# Patient Record
Sex: MALE | Race: WHITE
[De-identification: names, ages, dates, MRNs, and addresses within clinical notes are randomized per-mention and may not be internally consistent; named-entity substitution may affect disease eponyms.]

---

## 2017-08-29 PROBLEM — J43.2 CENTRILOBULAR EMPHYSEMA (HCC): Status: ACTIVE | Noted: 2017-08-29

## 2018-02-20 PROBLEM — I10 ESSENTIAL HYPERTENSION: Status: ACTIVE | Noted: 2018-02-20

## 2018-02-20 PROBLEM — E78.00 PURE HYPERCHOLESTEROLEMIA: Status: ACTIVE | Noted: 2018-02-20

## 2018-08-27 PROBLEM — E55.9 VITAMIN D DEFICIENCY: Status: ACTIVE | Noted: 2018-08-27

## 2019-01-16 PROBLEM — E78.2 MIXED HYPERLIPIDEMIA: Status: ACTIVE | Noted: 2019-01-10

## 2019-01-16 PROBLEM — D68.59 HYPERCOAGULABLE STATE (HCC): Status: ACTIVE | Noted: 2017-09-07

## 2019-01-16 PROBLEM — K21.9 GASTROESOPHAGEAL REFLUX DISEASE WITHOUT ESOPHAGITIS: Status: ACTIVE | Noted: 2019-01-10

## 2019-01-16 PROBLEM — I48.91 ATRIAL FIBRILLATION WITH RVR (HCC): Status: ACTIVE | Noted: 2017-09-11

## 2020-02-28 PROBLEM — E78.00 PURE HYPERCHOLESTEROLEMIA: Status: RESOLVED | Noted: 2018-02-20 | Resolved: 2020-02-28

## 2020-08-29 LAB — HBA1C MFR BLD HPLC: 6 %

## 2022-03-14 PROBLEM — I48.0 PAROXYSMAL ATRIAL FIBRILLATION (HCC): Status: ACTIVE | Noted: 2022-03-14

## 2022-03-18 PROBLEM — I48.0 PAROXYSMAL ATRIAL FIBRILLATION (HCC): Status: ACTIVE | Noted: 2022-03-14

## 2022-03-18 PROBLEM — D68.59 HYPERCOAGULABLE STATE (HCC): Status: ACTIVE | Noted: 2017-09-07

## 2022-03-18 PROBLEM — I48.91 ATRIAL FIBRILLATION WITH RVR (HCC): Status: ACTIVE | Noted: 2017-09-11

## 2022-03-19 PROBLEM — K21.9 GASTROESOPHAGEAL REFLUX DISEASE WITHOUT ESOPHAGITIS: Status: ACTIVE | Noted: 2019-01-10

## 2022-03-19 PROBLEM — E78.2 MIXED HYPERLIPIDEMIA: Status: ACTIVE | Noted: 2019-01-10

## 2022-03-19 PROBLEM — J43.2 CENTRILOBULAR EMPHYSEMA (HCC): Status: ACTIVE | Noted: 2017-08-29

## 2022-03-19 PROBLEM — I10 ESSENTIAL HYPERTENSION: Status: ACTIVE | Noted: 2018-02-20

## 2022-03-19 PROBLEM — E55.9 VITAMIN D DEFICIENCY: Status: ACTIVE | Noted: 2018-08-27

## 2022-08-24 RX ORDER — ESOMEPRAZOLE MAGNESIUM 40 MG/1
40 CAPSULE, DELAYED RELEASE ORAL DAILY
Qty: 30 CAPSULE | Refills: 0 | Status: SHIPPED | OUTPATIENT
Start: 2022-08-24

## 2023-01-18 ENCOUNTER — TELEPHONE (OUTPATIENT)
Dept: FAMILY MEDICINE CLINIC | Facility: CLINIC | Age: 73
End: 2023-01-18

## 2023-01-19 ENCOUNTER — TELEPHONE (OUTPATIENT)
Dept: FAMILY MEDICINE CLINIC | Facility: CLINIC | Age: 73
End: 2023-01-19

## 2023-01-19 DIAGNOSIS — E11.9 TYPE 2 DIABETES MELLITUS WITHOUT COMPLICATION, WITHOUT LONG-TERM CURRENT USE OF INSULIN (HCC): ICD-10-CM

## 2023-01-19 DIAGNOSIS — I10 ESSENTIAL (PRIMARY) HYPERTENSION: Primary | ICD-10-CM

## 2023-01-19 RX ORDER — ERGOCALCIFEROL 1.25 MG/1
50000 CAPSULE ORAL
Qty: 4 CAPSULE | Refills: 2 | Status: SHIPPED | OUTPATIENT
Start: 2023-01-19

## 2023-01-19 RX ORDER — ESOMEPRAZOLE MAGNESIUM 40 MG/1
40 CAPSULE, DELAYED RELEASE ORAL DAILY
Qty: 30 CAPSULE | Refills: 0 | Status: SHIPPED | OUTPATIENT
Start: 2023-01-19

## 2023-01-19 NOTE — TELEPHONE ENCOUNTER
Patient has not been seen in over a year has diabetes needs lab work before any more refills can be given and needs a follow-up appointment in the office to go over his lab results

## 2023-01-19 NOTE — TELEPHONE ENCOUNTER
----- Message from [facility] sent at 1/17/2023  2:12 PM EST -----  Subject: Message to Provider    QUESTIONS  Information for Provider? Please have nurse or Dr. Norlene Penton call patient's   son Margaux Brice regarding getting some of his medication refill hoping sooner   that his appt on the 3/6  ---------------------------------------------------------------------------  --------------  0026 [employer] [facility]  [phone]; OK to leave message on voicemail  ---------------------------------------------------------------------------  --------------  SCRIPT ANSWERS  Relationship to Patient? Other  Representative Name? Néstor Brice  Is the Representative on the appropriate HIPAA document in Epic?  Yes

## 2023-02-13 RX ORDER — ALBUTEROL SULFATE 2.5 MG/3ML
2.5 SOLUTION RESPIRATORY (INHALATION) 4 TIMES DAILY
Qty: 540 ML | Refills: 0 | Status: SHIPPED | OUTPATIENT
Start: 2023-02-13

## 2023-03-06 ENCOUNTER — OFFICE VISIT (OUTPATIENT)
Dept: FAMILY MEDICINE CLINIC | Facility: CLINIC | Age: 73
End: 2023-03-06
Payer: MEDICARE

## 2023-03-06 VITALS — BODY MASS INDEX: 25.73 KG/M2 | WEIGHT: 190 LBS | HEIGHT: 72 IN

## 2023-03-06 DIAGNOSIS — J30.89 ENVIRONMENTAL AND SEASONAL ALLERGIES: ICD-10-CM

## 2023-03-06 DIAGNOSIS — K21.9 GASTROESOPHAGEAL REFLUX DISEASE WITHOUT ESOPHAGITIS: ICD-10-CM

## 2023-03-06 DIAGNOSIS — R11.2 NAUSEA AND VOMITING, UNSPECIFIED VOMITING TYPE: ICD-10-CM

## 2023-03-06 DIAGNOSIS — E11.9 TYPE 2 DIABETES MELLITUS WITHOUT COMPLICATION, WITHOUT LONG-TERM CURRENT USE OF INSULIN (HCC): Primary | ICD-10-CM

## 2023-03-06 DIAGNOSIS — E55.9 VITAMIN D DEFICIENCY: ICD-10-CM

## 2023-03-06 DIAGNOSIS — E78.2 MIXED HYPERLIPIDEMIA: ICD-10-CM

## 2023-03-06 LAB
ALBUMIN SERPL-MCNC: 3.5 G/DL (ref 3.2–4.6)
ALBUMIN/GLOB SERPL: 1.2 (ref 0.4–1.6)
ALP SERPL-CCNC: 84 U/L (ref 50–136)
ALT SERPL-CCNC: 54 U/L (ref 12–65)
ANION GAP SERPL CALC-SCNC: 8 MMOL/L (ref 2–11)
AST SERPL-CCNC: 19 U/L (ref 15–37)
BILIRUB SERPL-MCNC: 0.8 MG/DL (ref 0.2–1.1)
BUN SERPL-MCNC: 10 MG/DL (ref 8–23)
CALCIUM SERPL-MCNC: 8.4 MG/DL (ref 8.3–10.4)
CHLORIDE SERPL-SCNC: 97 MMOL/L (ref 101–110)
CHOLEST SERPL-MCNC: 120 MG/DL
CO2 SERPL-SCNC: 29 MMOL/L (ref 21–32)
CREAT SERPL-MCNC: 0.9 MG/DL (ref 0.8–1.5)
GLOBULIN SER CALC-MCNC: 3 G/DL (ref 2.8–4.5)
GLUCOSE SERPL-MCNC: 249 MG/DL (ref 65–100)
HDLC SERPL-MCNC: 83 MG/DL (ref 40–60)
HDLC SERPL: 1.4
LDLC SERPL CALC-MCNC: 8.2 MG/DL
MICROALB/CREAT RATIO, POC: ABNORMAL
MICROALBUMIN URINE, POC: 50 MG/L
POTASSIUM SERPL-SCNC: 3.9 MMOL/L (ref 3.5–5.1)
PROT SERPL-MCNC: 6.5 G/DL (ref 6.3–8.2)
SODIUM SERPL-SCNC: 134 MMOL/L (ref 133–143)
TRIGL SERPL-MCNC: 144 MG/DL (ref 35–150)
VLDLC SERPL CALC-MCNC: 28.8 MG/DL (ref 6–23)

## 2023-03-06 PROCEDURE — 82043 UR ALBUMIN QUANTITATIVE: CPT | Performed by: FAMILY MEDICINE

## 2023-03-06 PROCEDURE — 99215 OFFICE O/P EST HI 40 MIN: CPT | Performed by: FAMILY MEDICINE

## 2023-03-06 PROCEDURE — 1123F ACP DISCUSS/DSCN MKR DOCD: CPT | Performed by: FAMILY MEDICINE

## 2023-03-06 RX ORDER — ONDANSETRON 4 MG/1
4 TABLET, ORALLY DISINTEGRATING ORAL EVERY 8 HOURS PRN
Qty: 30 TABLET | Refills: 1 | Status: SHIPPED | OUTPATIENT
Start: 2023-03-06

## 2023-03-06 RX ORDER — ESOMEPRAZOLE MAGNESIUM 40 MG/1
40 CAPSULE, DELAYED RELEASE ORAL DAILY
Qty: 30 CAPSULE | Refills: 0 | Status: SHIPPED | OUTPATIENT
Start: 2023-03-06

## 2023-03-06 RX ORDER — CETIRIZINE HYDROCHLORIDE 5 MG/1
5 TABLET ORAL DAILY
Qty: 90 TABLET | Refills: 2 | Status: SHIPPED | OUTPATIENT
Start: 2023-03-06

## 2023-03-06 RX ORDER — FERROUS SULFATE 325(65) MG
325 TABLET ORAL
COMMUNITY
Start: 2023-02-27 | End: 2023-06-19

## 2023-03-06 RX ORDER — ERGOCALCIFEROL 1.25 MG/1
50000 CAPSULE ORAL
Qty: 13 CAPSULE | Refills: 1 | Status: SHIPPED | OUTPATIENT
Start: 2023-03-06

## 2023-03-06 SDOH — ECONOMIC STABILITY: INCOME INSECURITY: HOW HARD IS IT FOR YOU TO PAY FOR THE VERY BASICS LIKE FOOD, HOUSING, MEDICAL CARE, AND HEATING?: NOT HARD AT ALL

## 2023-03-06 SDOH — ECONOMIC STABILITY: FOOD INSECURITY: WITHIN THE PAST 12 MONTHS, YOU WORRIED THAT YOUR FOOD WOULD RUN OUT BEFORE YOU GOT MONEY TO BUY MORE.: NEVER TRUE

## 2023-03-06 SDOH — ECONOMIC STABILITY: FOOD INSECURITY: WITHIN THE PAST 12 MONTHS, THE FOOD YOU BOUGHT JUST DIDN'T LAST AND YOU DIDN'T HAVE MONEY TO GET MORE.: NEVER TRUE

## 2023-03-06 SDOH — ECONOMIC STABILITY: HOUSING INSECURITY
IN THE LAST 12 MONTHS, WAS THERE A TIME WHEN YOU DID NOT HAVE A STEADY PLACE TO SLEEP OR SLEPT IN A SHELTER (INCLUDING NOW)?: NO

## 2023-03-06 ASSESSMENT — PATIENT HEALTH QUESTIONNAIRE - PHQ9
9. THOUGHTS THAT YOU WOULD BE BETTER OFF DEAD, OR OF HURTING YOURSELF: 0
SUM OF ALL RESPONSES TO PHQ QUESTIONS 1-9: 0
7. TROUBLE CONCENTRATING ON THINGS, SUCH AS READING THE NEWSPAPER OR WATCHING TELEVISION: 0
SUM OF ALL RESPONSES TO PHQ QUESTIONS 1-9: 0
8. MOVING OR SPEAKING SO SLOWLY THAT OTHER PEOPLE COULD HAVE NOTICED. OR THE OPPOSITE, BEING SO FIGETY OR RESTLESS THAT YOU HAVE BEEN MOVING AROUND A LOT MORE THAN USUAL: 0
5. POOR APPETITE OR OVEREATING: 0
SUM OF ALL RESPONSES TO PHQ QUESTIONS 1-9: 0
1. LITTLE INTEREST OR PLEASURE IN DOING THINGS: 0
SUM OF ALL RESPONSES TO PHQ QUESTIONS 1-9: 0
2. FEELING DOWN, DEPRESSED OR HOPELESS: 0
6. FEELING BAD ABOUT YOURSELF - OR THAT YOU ARE A FAILURE OR HAVE LET YOURSELF OR YOUR FAMILY DOWN: 0
4. FEELING TIRED OR HAVING LITTLE ENERGY: 0
10. IF YOU CHECKED OFF ANY PROBLEMS, HOW DIFFICULT HAVE THESE PROBLEMS MADE IT FOR YOU TO DO YOUR WORK, TAKE CARE OF THINGS AT HOME, OR GET ALONG WITH OTHER PEOPLE: 0
3. TROUBLE FALLING OR STAYING ASLEEP: 0
SUM OF ALL RESPONSES TO PHQ9 QUESTIONS 1 & 2: 0

## 2023-03-06 ASSESSMENT — ENCOUNTER SYMPTOMS
RHINORRHEA: 0
ABDOMINAL PAIN: 0
DIARRHEA: 0
COUGH: 0
SHORTNESS OF BREATH: 0
SINUS PAIN: 0

## 2023-03-06 NOTE — PROGRESS NOTES
PROGRESS NOTE    SUBJECTIVE:   Ramo Dugan is a 67 y.o. male seen for a follow up visit regarding the following chief complaint:     Chief Complaint   Patient presents with    Follow-Up from Hospital     Patient comes for a follow up after being hospitalized for COPD exacerbation    Medication Refill           HPI  Patient presents to the office today after being hospitalized for COPD and has missed multiple appointments his annual physical secondary to being sick all the time and being in the hospital and needs refills on his medication and was told he was not getting any medicines unless he comes in presents to the office for with his son and they state that they could not come in because he could not get oxygen tank to make the necessary arrangements to be able to come here without oxygen    Past Medical History, Past Surgical History, Family history, Social History, and Medications were all reviewed with the patient today and updated as necessary. Current Outpatient Medications   Medication Sig Dispense Refill    ferrous sulfate (IRON 325) 325 (65 Fe) MG tablet Take 325 mg by mouth      albuterol (PROVENTIL) (2.5 MG/3ML) 0.083% nebulizer solution Take 3 mLs by nebulization 4 times daily 540 mL 0    esomeprazole (NEXIUM) 40 MG delayed release capsule Take 1 capsule by mouth daily 30 capsule 0    metFORMIN (GLUCOPHAGE) 500 MG tablet Take 1 tablet by mouth 2 times daily (with meals) TAKE 1 TABLET BY MOUTH TWICE DAILY (WITH MEALS) 60 tablet 0    ergocalciferol (ERGOCALCIFEROL) 1.25 MG (30243 UT) capsule Take 1 capsule by mouth every 7 days 4 capsule 2    OXYGEN Use as instructed. Use as instructed.  DME: [facility] 2-3lpm continuously      PREDNISONE PO Take 5 mg by mouth      acetaminophen (TYLENOL) 500 MG tablet Take 1,000 mg by mouth daily as needed      albuterol sulfate  (90 Base) MCG/ACT inhaler Inhale 2 puffs into the lungs every 4 hours as needed      carvedilol (COREG) 3.125 MG tablet Take 3.125 mg by mouth 2 times daily (with meals)      cetirizine (ZYRTEC) 5 MG tablet Take by mouth      Fluticasone-Umeclidin-Vilant (TRELEGY ELLIPTA) 200-62.5-25 MCG/INH AEPB INHALE 1 PUFF ONCE DAILY      lisinopril (PRINIVIL;ZESTRIL) 10 MG tablet Take 10 mg by mouth daily      mirtazapine (REMERON) 15 MG tablet Take 15 mg by mouth      ondansetron (ZOFRAN-ODT) 4 MG disintegrating tablet Take 4 mg by mouth every 8 hours as needed      rosuvastatin (CRESTOR) 20 MG tablet Take 20 mg by mouth      warfarin (COUMADIN) 10 MG tablet Take 10 mg by mouth daily      warfarin (COUMADIN) 5 MG tablet Take 5 mg by mouth daily       No current facility-administered medications for this visit. Allergies   Allergen Reactions    Azithromycin Other (See Comments)     EKG changes  Other reaction(s): Other- (not listed) - Allergy  EKG changes, including Vtach. Erythromycin Other (See Comments)     Other reaction(s):  Other- (not listed) - Allergy  EKG changes     Patient Active Problem List   Diagnosis    Atrial fibrillation with RVR (HCC)    Hypercoagulable state ([facility]    Malnutrition ([facility]    Acute vascular disorder of intestine (HCC)    Paroxysmal atrial fibrillation (HCC)    Centrilobular emphysema (HCC)    Tobacco use    Elevated troponin    Abnormal weight loss    Vitamin D deficiency    Cardiomyopathy ([facility]    Mixed hyperlipidemia    Abnormal findings on diagnostic imaging of other specified body structures    Long term current use of anticoagulant therapy    Essential hypertension    Chronic combined systolic and diastolic congestive heart failure (HCC)    Mesenteric ischemia, chronic (HCC)    Acute on chronic respiratory failure with hypoxemia (HCC)    Gastroesophageal reflux disease without esophagitis    Hypoxemia    Diabetes mellitus type II, controlled, with no complications (HCC)    COPD (chronic obstructive pulmonary disease) ([facility])    H/O ischemic bowel disease    Embolism (HCC)    Nonsustained ventricular tachycardia     Past Medical History:   Diagnosis Date    Abnormal weight loss 2016    Acute vascular disorder of intestine ([facility] 2016    Chronic lung disease     COPD (chronic obstructive pulmonary disease) ([facility] 2016    Diabetes mellitus type II, controlled, with no complications ([facility] 3/10/9999    Elevated troponin 2016    Tobacco use 2016    Vitamin D deficiency 2018     Past Surgical History:   Procedure Laterality Date    APPENDECTOMY  2009    CYST REMOVAL Right     Right shoulder    HERNIA REPAIR  2005    LAPAROSCOPY  2015    Dr. Alen Trevizo  2015    Washout & abd closure-Dr. Xiomara Mann    OTHER SURGICAL HISTORY  2015    Peg Tube Placement-Dr. Yuliya Davila    SMALL INTESTINE SURGERY  2015    Abd washout & skin closure-Dr. Elder Woodruff    SMALL INTESTINE SURGERY  2015    Abd washout & vac placement-Dr. Luis Felipe Luke     Family History   Problem Relation Age of Onset    Cancer Mother     Heart Disease Father     Cancer Sister     Cancer Brother      Social History     Tobacco Use    Smoking status: Former     Packs/day: 0.25     Years: 55.00     Pack years: 13.75     Types: Cigarettes     Start date: 1966     Quit date: 2021     Years since quittin.7    Smokeless tobacco: Never   Substance Use Topics    Alcohol use: No         Review of Systems   Constitutional:  Negative for chills, fatigue and fever. HENT:  Negative for congestion, rhinorrhea and sinus pain. Eyes:  Negative for visual disturbance. Respiratory:  Negative for cough and shortness of breath. Cardiovascular:  Negative for chest pain. Gastrointestinal:  Negative for abdominal pain and diarrhea. Genitourinary:  Negative for dysuria. Musculoskeletal:  Negative for arthralgias and myalgias. Neurological:  Negative for dizziness, weakness and headaches. Psychiatric/Behavioral: Negative.          OBJECTIVE:  Ht 6' (1.829 m)   Wt 190 lb (86.2 kg)   BMI 25.77 kg/m² Physical Exam  Vitals and nursing note reviewed. Constitutional:       Appearance: Normal appearance. HENT:      Head: Normocephalic and atraumatic. Right Ear: Tympanic membrane normal.      Left Ear: Tympanic membrane normal.      Nose: Nose normal.      Mouth/Throat:      Mouth: Mucous membranes are moist.   Eyes:      Conjunctiva/sclera: Conjunctivae normal.      Pupils: Pupils are equal, round, and reactive to light. Cardiovascular:      Rate and Rhythm: Normal rate and regular rhythm. Pulses: Normal pulses. Heart sounds: Normal heart sounds. Pulmonary:      Effort: Pulmonary effort is normal.      Breath sounds: Normal breath sounds. Abdominal:      General: Abdomen is flat. Bowel sounds are normal.      Palpations: Abdomen is soft. Musculoskeletal:         General: Normal range of motion. Cervical back: Normal range of motion and neck supple. Skin:     General: Skin is warm and dry. Neurological:      General: No focal deficit present. Mental Status: He is alert and oriented to person, place, and time. Psychiatric:         Behavior: Behavior normal.        Medical problems and test results were reviewed with the patient today. No results found for this or any previous visit (from the past 672 hour(s)).     ASSESSMENT and PLAN    Visit Diagnoses and Associated Orders       Type 2 diabetes mellitus without complication, without long-term current use of insulin ([facility])    -  Primary         Gastroesophageal reflux disease without esophagitis             Environmental and seasonal allergies             Vitamin D deficiency             ORDERS WITHOUT AN ASSOCIATED DIAGNOSIS    ferrous sulfate (IRON 325) 325 (65 Fe) MG tablet [[id number]]      metFORMIN (GLUCOPHAGE) 500 MG tablet [[id number]]   - Pending Order    esomeprazole (NEXIUM) 40 MG delayed release capsule [[id number]]   - Pending Order    cetirizine (ZYRTEC) 5 MG tablet [[id number]]   - Pending Order    ondansetron (ZOFRAN-ODT) 4 MG disintegrating tablet [[id number]]   - Pending Order    ergocalciferol (ERGOCALCIFEROL) 1.25 MG (28293 UT) capsule [[id number]]   - Pending Order                Diagnosis Orders   1. Type 2 diabetes mellitus without complication, without long-term current use of insulin ([facility])  metFORMIN (GLUCOPHAGE) 500 MG tablet    AMB POC URINE, MICROALBUMIN    Hemoglobin A1C    Comprehensive Metabolic Panel      2. Gastroesophageal reflux disease without esophagitis  esomeprazole (NEXIUM) 40 MG delayed release capsule      3. Environmental and seasonal allergies  cetirizine (ZYRTEC) 5 MG tablet      4. Vitamin D deficiency  ergocalciferol (ERGOCALCIFEROL) 1.25 MG (45225 UT) capsule      5. Nausea and vomiting, unspecified vomiting type  ondansetron (ZOFRAN-ODT) 4 MG disintegrating tablet      6. Mixed hyperlipidemia  Lipid Panel      , Larissa Net was seen today for follow-up from hospital and medication refill. Diagnoses and all orders for this visit:    Type 2 diabetes mellitus without complication, without long-term current use of insulin ([facility])  -     metFORMIN (GLUCOPHAGE) 500 MG tablet; Take 1 tablet by mouth 2 times daily (with meals) TAKE 1 TABLET BY MOUTH TWICE DAILY (WITH MEALS)  -     AMB POC URINE, MICROALBUMIN; Future  -     Hemoglobin A1C; Future  -     Comprehensive Metabolic Panel; Future    Gastroesophageal reflux disease without esophagitis  -     esomeprazole (NEXIUM) 40 MG delayed release capsule; Take 1 capsule by mouth daily    Environmental and seasonal allergies  -     cetirizine (ZYRTEC) 5 MG tablet; Take 1 tablet by mouth daily    Vitamin D deficiency  -     ergocalciferol (ERGOCALCIFEROL) 1.25 MG (51407 UT) capsule; Take 1 capsule by mouth every 7 days    Nausea and vomiting, unspecified vomiting type  -     ondansetron (ZOFRAN-ODT) 4 MG disintegrating tablet; Take 1 tablet by mouth every 8 hours as needed for Nausea    Mixed hyperlipidemia  -     Lipid Panel;  Future    , We will get some baseline labs went over his medications recommended setting him up for physical in the near future/Medicare wellness visit we will have him return back follow-up of his labs or call back with results and plan. Kiara Stewart More than 50% of this 40 minute visit was spent counseling the patient about multiple complaints.

## 2023-03-07 LAB
EST. AVERAGE GLUCOSE BLD GHB EST-MCNC: 226 MG/DL
HBA1C MFR BLD: 9.5 % (ref 4.8–5.6)

## 2023-03-16 ENCOUNTER — TELEMEDICINE (OUTPATIENT)
Dept: FAMILY MEDICINE CLINIC | Facility: CLINIC | Age: 73
End: 2023-03-16
Payer: MEDICARE

## 2023-03-16 DIAGNOSIS — E11.9 TYPE 2 DIABETES MELLITUS WITHOUT COMPLICATION, WITHOUT LONG-TERM CURRENT USE OF INSULIN (HCC): ICD-10-CM

## 2023-03-16 PROCEDURE — 99442 PR PHYS/QHP TELEPHONE EVALUATION 11-20 MIN: CPT | Performed by: FAMILY MEDICINE

## 2023-03-16 RX ORDER — GLIPIZIDE 10 MG/1
10 TABLET, FILM COATED, EXTENDED RELEASE ORAL DAILY
Qty: 30 TABLET | Refills: 11 | Status: SHIPPED | OUTPATIENT
Start: 2023-03-16 | End: 2023-04-15

## 2023-03-16 ASSESSMENT — PATIENT HEALTH QUESTIONNAIRE - PHQ9
7. TROUBLE CONCENTRATING ON THINGS, SUCH AS READING THE NEWSPAPER OR WATCHING TELEVISION: 0
1. LITTLE INTEREST OR PLEASURE IN DOING THINGS: 0
8. MOVING OR SPEAKING SO SLOWLY THAT OTHER PEOPLE COULD HAVE NOTICED. OR THE OPPOSITE, BEING SO FIGETY OR RESTLESS THAT YOU HAVE BEEN MOVING AROUND A LOT MORE THAN USUAL: 0
9. THOUGHTS THAT YOU WOULD BE BETTER OFF DEAD, OR OF HURTING YOURSELF: 0
5. POOR APPETITE OR OVEREATING: 0
SUM OF ALL RESPONSES TO PHQ QUESTIONS 1-9: 0
SUM OF ALL RESPONSES TO PHQ QUESTIONS 1-9: 0
2. FEELING DOWN, DEPRESSED OR HOPELESS: 0
SUM OF ALL RESPONSES TO PHQ QUESTIONS 1-9: 0
SUM OF ALL RESPONSES TO PHQ9 QUESTIONS 1 & 2: 0
6. FEELING BAD ABOUT YOURSELF - OR THAT YOU ARE A FAILURE OR HAVE LET YOURSELF OR YOUR FAMILY DOWN: 0
10. IF YOU CHECKED OFF ANY PROBLEMS, HOW DIFFICULT HAVE THESE PROBLEMS MADE IT FOR YOU TO DO YOUR WORK, TAKE CARE OF THINGS AT HOME, OR GET ALONG WITH OTHER PEOPLE: 0
3. TROUBLE FALLING OR STAYING ASLEEP: 0
4. FEELING TIRED OR HAVING LITTLE ENERGY: 0
SUM OF ALL RESPONSES TO PHQ QUESTIONS 1-9: 0

## 2023-03-16 ASSESSMENT — ENCOUNTER SYMPTOMS
VOMITING: 0
SHORTNESS OF BREATH: 0
NAUSEA: 0

## 2023-03-16 NOTE — PROGRESS NOTES
PROGRESS NOTE    SUBJECTIVE:   Jarred Swain is a 67 y.o. male seen for a follow up visit regarding the following chief complaint:         HPI Julian Henning is doing a phone call visit to go over his lab results without any new complaints      Past Medical History, Past Surgical History, Family history, Social History, and Medications were all reviewed with the patient today and updated as necessary. Current Outpatient Medications   Medication Sig Dispense Refill    metFORMIN (GLUCOPHAGE) 500 MG tablet TAKE 2TABLET BY MOUTH TWICE DAILY (WITH MEALS) 360 tablet 1    glipiZIDE (GLUCOTROL XL) 10 MG extended release tablet Take 1 tablet by mouth daily 30 tablet 11    esomeprazole (NEXIUM) 40 MG delayed release capsule Take 1 capsule by mouth daily 30 capsule 0    cetirizine (ZYRTEC) 5 MG tablet Take 1 tablet by mouth daily 90 tablet 2    ondansetron (ZOFRAN-ODT) 4 MG disintegrating tablet Take 1 tablet by mouth every 8 hours as needed for Nausea 30 tablet 1    ergocalciferol (ERGOCALCIFEROL) 1.25 MG (79840 UT) capsule Take 1 capsule by mouth every 7 days 13 capsule 1    albuterol (PROVENTIL) (2.5 MG/3ML) 0.083% nebulizer solution Take 3 mLs by nebulization 4 times daily 540 mL 0    OXYGEN Use as instructed. Use as instructed.  DME: [facility] 2-3lpm continuously      PREDNISONE PO Take 5 mg by mouth      acetaminophen (TYLENOL) 500 MG tablet Take 1,000 mg by mouth daily as needed      albuterol sulfate  (90 Base) MCG/ACT inhaler Inhale 2 puffs into the lungs every 4 hours as needed      carvedilol (COREG) 3.125 MG tablet Take 3.125 mg by mouth 2 times daily (with meals)      Fluticasone-Umeclidin-Vilant (TRELEGY ELLIPTA) 200-62.5-25 MCG/INH AEPB INHALE 1 PUFF ONCE DAILY      lisinopril (PRINIVIL;ZESTRIL) 10 MG tablet Take 10 mg by mouth daily      mirtazapine (REMERON) 15 MG tablet Take 15 mg by mouth      rosuvastatin (CRESTOR) 20 MG tablet Take 20 mg by mouth      warfarin (COUMADIN) 10 MG tablet Take 10 mg by mouth daily      warfarin (COUMADIN) 5 MG tablet Take 5 mg by mouth daily       No current facility-administered medications for this visit. Allergies   Allergen Reactions    Azithromycin Other (See Comments)     EKG changes  Other reaction(s): Other- (not listed) - Allergy  EKG changes, including Vtach. Erythromycin Other (See Comments)     Other reaction(s):  Other- (not listed) - Allergy  EKG changes     Patient Active Problem List   Diagnosis    Atrial fibrillation with RVR ([address]    Hypercoagulable state ([address]    Malnutrition ([address]    Acute vascular disorder of intestine (HCC)    Paroxysmal atrial fibrillation (HCC)    Centrilobular emphysema (HCC)    Tobacco use    Elevated troponin    Abnormal weight loss    Vitamin D deficiency    Cardiomyopathy ([address]    Mixed hyperlipidemia    Abnormal findings on diagnostic imaging of other specified body structures    Long term current use of anticoagulant therapy    Essential hypertension    Chronic combined systolic and diastolic congestive heart failure (HCC)    Mesenteric ischemia, chronic (HCC)    Acute on chronic respiratory failure with hypoxemia (HCC)    Gastroesophageal reflux disease without esophagitis    Hypoxemia    Diabetes mellitus type II, controlled, with no complications (HCC)    COPD (chronic obstructive pulmonary disease) (HCC)    H/O ischemic bowel disease    Embolism (HCC)    Nonsustained ventricular tachycardia     Past Medical History:   Diagnosis Date    Abnormal weight loss 2/18/2016    Acute vascular disorder of intestine ([address] 2/18/2016    Chronic lung disease     COPD (chronic obstructive pulmonary disease) ([address] 2/18/2016    Diabetes mellitus type II, controlled, with no complications ([address] 6/82/2719    Elevated troponin 2/18/2016    Tobacco use 2/18/2016    Vitamin D deficiency 8/27/2018     Past Surgical History:   Procedure Laterality Date    APPENDECTOMY  2009    CYST REMOVAL Right     Right shoulder    HERNIA REPAIR  2005    LAPAROSCOPY 2015    Dr. Jalyn Alvarez  2015    Washout & abd closure-Dr. Loyd Needs    OTHER SURGICAL HISTORY  2015    Peg Tube Placement-Dr. Salbador Monson    SMALL INTESTINE SURGERY  2015    Abd washout & skin closure-Dr. Hill Held    SMALL INTESTINE SURGERY  2015    Abd washout & vac placement-Dr. Sukhwinder Alva     Family History   Problem Relation Age of Onset    Cancer Mother     Heart Disease Father     Cancer Sister     Cancer Brother      Social History     Tobacco Use    Smoking status: Former     Packs/day: 0.25     Years: 55.00     Pack years: 13.75     Types: Cigarettes     Start date: 1966     Quit date: 2021     Years since quittin.7    Smokeless tobacco: Never   Substance Use Topics    Alcohol use: No         Review of Systems   Constitutional:  Negative for fatigue and fever. Respiratory:  Negative for shortness of breath. Cardiovascular:  Negative for chest pain. Gastrointestinal:  Negative for nausea and vomiting. OBJECTIVE:  There were no vitals taken for this visit. Physical Exam     Medical problems and test results were reviewed with the patient today.      Recent Results (from the past 672 hour(s))   Comprehensive Metabolic Panel    Collection Time: 23 11:29 AM   Result Value Ref Range    Sodium 134 133 - 143 mmol/L    Potassium 3.9 3.5 - 5.1 mmol/L    Chloride 97 (L) 101 - 110 mmol/L    CO2 29 21 - 32 mmol/L    Anion Gap 8 2 - 11 mmol/L    Glucose 249 (H) 65 - 100 mg/dL    BUN 10 8 - 23 MG/DL    Creatinine 0.90 0.8 - 1.5 MG/DL    Est, Glom Filt Rate >60 >60 ml/min/1.73m2    Calcium 8.4 8.3 - 10.4 MG/DL    Total Bilirubin 0.8 0.2 - 1.1 MG/DL    ALT 54 12 - 65 U/L    AST 19 15 - 37 U/L    Alk Phosphatase 84 50 - 136 U/L    Total Protein 6.5 6.3 - 8.2 g/dL    Albumin 3.5 3.2 - 4.6 g/dL    Globulin 3.0 2.8 - 4.5 g/dL    Albumin/Globulin Ratio 1.2 0.4 - 1.6     Lipid Panel    Collection Time: 23 11:29 AM   Result Value Ref Range    Cholesterol, Total 120 <200 MG/DL    Triglycerides 144 35 - 150 MG/DL    HDL 83 (H) 40 - 60 MG/DL    LDL Calculated 8.2 <100 MG/DL    VLDL Cholesterol Calculated 28.8 (H) 6.0 - 23.0 MG/DL    Chol/HDL Ratio 1.4     Hemoglobin A1C    Collection Time: 03/06/23 11:29 AM   Result Value Ref Range    Hemoglobin A1C 9.5 (H) 4.8 - 5.6 %    eAG 226 mg/dL   AMB POC URINE, MICROALBUMIN    Collection Time: 03/06/23 11:33 AM   Result Value Ref Range    Microalbumin urine, POC 50.0 MG/L    Microalb/Creat Ratio POC         ASSESSMENT and PLAN    Visit Diagnoses and Associated Orders       Type 2 diabetes mellitus without complication, without long-term current use of insulin ([facility])        metFORMIN (GLUCOPHAGE) 500 MG tablet [[id number]]      glipiZIDE (GLUCOTROL XL) 10 MG extended release tablet [[id number]]                       Diagnosis Orders   1. Type 2 diabetes mellitus without complication, without long-term current use of insulin ([facility])  metFORMIN (GLUCOPHAGE) 500 MG tablet    glipiZIDE (GLUCOTROL XL) 10 MG extended release tablet      , Diagnoses and all orders for this visit:    Type 2 diabetes mellitus without complication, without long-term current use of insulin ([facility])  -     metFORMIN (GLUCOPHAGE) 500 MG tablet; TAKE 2TABLET BY MOUTH TWICE DAILY (WITH MEALS)  -     glipiZIDE (GLUCOTROL XL) 10 MG extended release tablet;  Take 1 tablet by mouth daily    , Reviewed his labs answered all his questions counseling supportive care recommended increasing his metformin to 2 tablets of 500 twice daily and add Glucotrol along with diet and exercise recheck labs when he is due for his Medicare wellness visit/physical in June

## 2023-04-13 ENCOUNTER — TELEPHONE (OUTPATIENT)
Dept: FAMILY MEDICINE CLINIC | Facility: CLINIC | Age: 73
End: 2023-04-13

## 2023-04-14 DIAGNOSIS — K21.9 GASTROESOPHAGEAL REFLUX DISEASE WITHOUT ESOPHAGITIS: ICD-10-CM

## 2023-04-14 RX ORDER — ESOMEPRAZOLE MAGNESIUM 40 MG/1
40 CAPSULE, DELAYED RELEASE ORAL DAILY
Qty: 90 CAPSULE | Refills: 3 | Status: SHIPPED | OUTPATIENT
Start: 2023-04-14

## 2023-08-29 ENCOUNTER — TELEPHONE (OUTPATIENT)
Dept: FAMILY MEDICINE CLINIC | Facility: CLINIC | Age: 73
End: 2023-08-29

## 2023-09-01 DIAGNOSIS — E55.9 VITAMIN D DEFICIENCY: ICD-10-CM

## 2023-09-06 RX ORDER — ERGOCALCIFEROL 1.25 MG/1
CAPSULE ORAL
Qty: 13 CAPSULE | Refills: 0 | OUTPATIENT
Start: 2023-09-06

## 2023-09-08 ENCOUNTER — OFFICE VISIT (OUTPATIENT)
Dept: FAMILY MEDICINE CLINIC | Facility: CLINIC | Age: 73
End: 2023-09-08
Payer: MEDICARE

## 2023-09-08 VITALS
HEIGHT: 72 IN | DIASTOLIC BLOOD PRESSURE: 74 MMHG | BODY MASS INDEX: 25.27 KG/M2 | WEIGHT: 186.6 LBS | SYSTOLIC BLOOD PRESSURE: 128 MMHG

## 2023-09-08 DIAGNOSIS — D64.9 ANEMIA, UNSPECIFIED TYPE: ICD-10-CM

## 2023-09-08 DIAGNOSIS — J30.89 ENVIRONMENTAL AND SEASONAL ALLERGIES: ICD-10-CM

## 2023-09-08 DIAGNOSIS — E55.9 VITAMIN D DEFICIENCY: ICD-10-CM

## 2023-09-08 DIAGNOSIS — R31.9 HEMATURIA, UNSPECIFIED TYPE: ICD-10-CM

## 2023-09-08 DIAGNOSIS — R11.2 NAUSEA AND VOMITING, UNSPECIFIED VOMITING TYPE: ICD-10-CM

## 2023-09-08 DIAGNOSIS — K21.9 GASTROESOPHAGEAL REFLUX DISEASE WITHOUT ESOPHAGITIS: ICD-10-CM

## 2023-09-08 DIAGNOSIS — D72.829 LEUKOCYTOSIS, UNSPECIFIED TYPE: Primary | ICD-10-CM

## 2023-09-08 LAB
BILIRUBIN, URINE, POC: NEGATIVE
BLOOD URINE, POC: NEGATIVE
GLUCOSE URINE, POC: NEGATIVE
GRANS ABSOLUTE, POC: 6.8 K/UL
GRANULOCYTES %, POC: 77.1 %
HEMATOCRIT, POC: ABNORMAL %
HEMOGLOBIN, POC: ABNORMAL G/DL
KETONES, URINE, POC: NEGATIVE
LEUKOCYTE ESTERASE, URINE, POC: ABNORMAL
LYMPHOCYTE %, POC: 17.3 %
LYMPHS ABSOLUTE, POC: 1.5 K/UL
MCH, POC: ABNORMAL PG (ref 20–?)
MCHC, POC: ABNORMAL
MCV, POC: ABNORMAL
MONOCYTE %, POC: 5.6 %
MONOCYTE, ABSOLUTE POC: 0.5 K/UL
MPV, POC: 7.7 FL
NITRITE, URINE, POC: NEGATIVE
PH, URINE, POC: 5.5 (ref 4.6–8)
PLATELET COUNT, POC: 332 K/UL
PROTEIN,URINE, POC: NEGATIVE
RBC, POC: 4.47 M/UL
RDW, POC: ABNORMAL %
SPECIFIC GRAVITY, URINE, POC: 1.01 (ref 1–1.03)
URINALYSIS CLARITY, POC: ABNORMAL
URINALYSIS COLOR, POC: YELLOW
UROBILINOGEN, POC: NORMAL
WBC, POC: 8.8 K/UL

## 2023-09-08 PROCEDURE — 1123F ACP DISCUSS/DSCN MKR DOCD: CPT | Performed by: FAMILY MEDICINE

## 2023-09-08 PROCEDURE — 99214 OFFICE O/P EST MOD 30 MIN: CPT | Performed by: FAMILY MEDICINE

## 2023-09-08 PROCEDURE — 3074F SYST BP LT 130 MM HG: CPT | Performed by: FAMILY MEDICINE

## 2023-09-08 PROCEDURE — 36415 COLL VENOUS BLD VENIPUNCTURE: CPT | Performed by: FAMILY MEDICINE

## 2023-09-08 PROCEDURE — 81003 URINALYSIS AUTO W/O SCOPE: CPT | Performed by: FAMILY MEDICINE

## 2023-09-08 PROCEDURE — 85025 COMPLETE CBC W/AUTO DIFF WBC: CPT | Performed by: FAMILY MEDICINE

## 2023-09-08 PROCEDURE — 3078F DIAST BP <80 MM HG: CPT | Performed by: FAMILY MEDICINE

## 2023-09-08 RX ORDER — AZITHROMYCIN 250 MG/1
TABLET, FILM COATED ORAL
COMMUNITY
Start: 2023-09-05

## 2023-09-08 RX ORDER — CETIRIZINE HYDROCHLORIDE 5 MG/1
5 TABLET ORAL DAILY
Qty: 90 TABLET | Refills: 3 | Status: SHIPPED | OUTPATIENT
Start: 2023-09-08

## 2023-09-08 RX ORDER — ESOMEPRAZOLE MAGNESIUM 40 MG/1
40 CAPSULE, DELAYED RELEASE ORAL DAILY
Qty: 90 CAPSULE | Refills: 3 | Status: SHIPPED | OUTPATIENT
Start: 2023-09-08

## 2023-09-08 RX ORDER — ERGOCALCIFEROL 1.25 MG/1
50000 CAPSULE ORAL
Qty: 13 CAPSULE | Refills: 4 | Status: SHIPPED | OUTPATIENT
Start: 2023-09-08

## 2023-09-08 RX ORDER — PREDNISONE 5 MG/1
5 TABLET ORAL DAILY
COMMUNITY
Start: 2023-08-10

## 2023-09-08 RX ORDER — ONDANSETRON 4 MG/1
4 TABLET, ORALLY DISINTEGRATING ORAL EVERY 8 HOURS PRN
Qty: 30 TABLET | Refills: 1 | Status: SHIPPED | OUTPATIENT
Start: 2023-09-08

## 2023-09-08 ASSESSMENT — ENCOUNTER SYMPTOMS
SHORTNESS OF BREATH: 0
COUGH: 0
SINUS PAIN: 0
ABDOMINAL PAIN: 0
DIARRHEA: 0
RHINORRHEA: 0

## 2023-09-08 NOTE — PROGRESS NOTES
visit:    Leukocytosis, unspecified type  -     AMB POC KTY COMPLETE CBC; Future  -     AMB POC KTY COMPLETE CBC    Hematuria, unspecified type  -     AMB POC URINALYSIS DIP STICK AUTO W/O MICRO    Vitamin D deficiency  -     ergocalciferol (ERGOCALCIFEROL) 1.25 MG (76601 UT) capsule; Take 1 capsule by mouth every 7 days    Environmental and seasonal allergies  -     cetirizine (ZYRTEC) 5 MG tablet; Take 1 tablet by mouth daily    Nausea and vomiting, unspecified vomiting type  -     ondansetron (ZOFRAN-ODT) 4 MG disintegrating tablet; Take 1 tablet by mouth every 8 hours as needed for Nausea    Gastroesophageal reflux disease without esophagitis  -     esomeprazole (NEXIUM) 40 MG delayed release capsule;  Take 1 capsule by mouth daily    Anemia, unspecified type  -     NH COLLECTION VENOUS BLOOD VENIPUNCTURE  -     Karissa[facility]    , Otherwise patient states he is feeling better he continues to be anemic despite being on iron we will continue him on his iron we will get hematology involved to find out if there is any other options for him port of care given follow-up for his physical in the near future

## 2023-09-27 ENCOUNTER — OFFICE VISIT (OUTPATIENT)
Age: 73
End: 2023-09-27
Payer: MEDICARE

## 2023-09-27 VITALS
HEART RATE: 92 BPM | DIASTOLIC BLOOD PRESSURE: 82 MMHG | HEIGHT: 72 IN | WEIGHT: 186 LBS | BODY MASS INDEX: 25.19 KG/M2 | SYSTOLIC BLOOD PRESSURE: 122 MMHG

## 2023-09-27 DIAGNOSIS — I10 ESSENTIAL (PRIMARY) HYPERTENSION: ICD-10-CM

## 2023-09-27 DIAGNOSIS — I42.9 CARDIOMYOPATHY, UNSPECIFIED TYPE (HCC): ICD-10-CM

## 2023-09-27 DIAGNOSIS — I48.0 PAROXYSMAL ATRIAL FIBRILLATION (HCC): Primary | ICD-10-CM

## 2023-09-27 DIAGNOSIS — I50.42 CHRONIC COMBINED SYSTOLIC (CONGESTIVE) AND DIASTOLIC (CONGESTIVE) HEART FAILURE (HCC): ICD-10-CM

## 2023-09-27 PROCEDURE — 3079F DIAST BP 80-89 MM HG: CPT | Performed by: INTERNAL MEDICINE

## 2023-09-27 PROCEDURE — 93000 ELECTROCARDIOGRAM COMPLETE: CPT | Performed by: INTERNAL MEDICINE

## 2023-09-27 PROCEDURE — 99214 OFFICE O/P EST MOD 30 MIN: CPT | Performed by: INTERNAL MEDICINE

## 2023-09-27 PROCEDURE — 1123F ACP DISCUSS/DSCN MKR DOCD: CPT | Performed by: INTERNAL MEDICINE

## 2023-09-27 PROCEDURE — 3074F SYST BP LT 130 MM HG: CPT | Performed by: INTERNAL MEDICINE

## 2023-09-27 RX ORDER — LISINOPRIL 10 MG/1
10 TABLET ORAL DAILY
Qty: 90 TABLET | Refills: 3 | Status: SHIPPED | OUTPATIENT
Start: 2023-09-27

## 2023-09-27 RX ORDER — ROSUVASTATIN CALCIUM 20 MG/1
20 TABLET, COATED ORAL DAILY
Qty: 90 TABLET | Refills: 3 | Status: SHIPPED | OUTPATIENT
Start: 2023-09-27

## 2023-09-27 RX ORDER — CARVEDILOL 3.12 MG/1
3.12 TABLET ORAL 2 TIMES DAILY WITH MEALS
Qty: 180 TABLET | Refills: 3 | Status: SHIPPED | OUTPATIENT
Start: 2023-09-27

## 2023-09-27 ASSESSMENT — ENCOUNTER SYMPTOMS
ORTHOPNEA: 0
ABDOMINAL PAIN: 0
SHORTNESS OF BREATH: 0

## 2023-09-27 NOTE — PROGRESS NOTES
DATA REVIEW    LIPID PANEL     Lab Results   Component Value Date    CHOL 120 03/06/2023    CHOL 117 04/13/2022    CHOL 198 08/17/2021     Lab Results   Component Value Date    TRIG 144 03/06/2023    TRIG 105 04/13/2022    TRIG 135 08/17/2021     Lab Results   Component Value Date    HDL 83 (H) 03/06/2023    HDL 67 04/13/2022    HDL 71 08/17/2021     Lab Results   Component Value Date    LDLCALC 8.2 03/06/2023    LDLCALC 31 04/13/2022    LDLCALC 104 (H) 08/17/2021     Lab Results   Component Value Date    LABVLDL 28.8 (H) 03/06/2023    LABVLDL 29 08/22/2019    VLDL 19 04/13/2022    VLDL 23 08/17/2021     Lab Results   Component Value Date    CHOLHDLRATIO 1.4 03/06/2023       BMP  Lab Results   Component Value Date/Time     03/06/2023 11:29 AM    K 3.9 03/06/2023 11:29 AM    CL 97 03/06/2023 11:29 AM    CO2 29 03/06/2023 11:29 AM    BUN 10 03/06/2023 11:29 AM    CREATININE 0.90 03/06/2023 11:29 AM    GLUCOSE 249 03/06/2023 11:29 AM    CALCIUM 8.4 03/06/2023 11:29 AM          EKG    Sinus rhythm        OUTSIDE RECORDS REVIEW    Records from outside providers have been reviewed and summarized as noted in the HPI, past history and data review sections of this note, and reviewed with patient. .       ASSESSMENT and PLAN    Siomara Soto was seen today for new patient and follow-up. Diagnoses and all orders for this visit:    Ventricular tachycardia ([address])  -     EKG 12 [address] Performed    Essential (primary) hypertension  -     EKG 12 Lead - Clinic Performed    Cardiomyopathy, unspecified ([address])  -     EKG 12 [facility] - Clinic Performed    Chronic combined systolic (congestive) and diastolic (congestive) heart failure (HCC)  -     EKG 12 [facility] - Clinic Performed    Unspecified atrial fibrillation (HCC)  -     EKG 12 [facility] - [facility] Performed  -     carvedilol (COREG) 3.125 MG tablet; Take 1 tablet by mouth 2 times daily (with meals)  -     lisinopril (PRINIVIL;ZESTRIL) 10 MG tablet;  Take 1 tablet by mouth daily  -

## 2024-02-21 DIAGNOSIS — E11.9 TYPE 2 DIABETES MELLITUS WITHOUT COMPLICATION, WITHOUT LONG-TERM CURRENT USE OF INSULIN (HCC): ICD-10-CM

## 2024-02-21 RX ORDER — GLIPIZIDE 10 MG/1
10 TABLET, FILM COATED, EXTENDED RELEASE ORAL DAILY
Qty: 90 TABLET | Refills: 1 | OUTPATIENT
Start: 2024-02-21

## 2024-02-21 NOTE — TELEPHONE ENCOUNTER
Please call and schedule a DM follow up. With labs. Thanks! Send back and I send refill to Dr. Solorio.

## 2024-04-02 DIAGNOSIS — E11.9 TYPE 2 DIABETES MELLITUS WITHOUT COMPLICATION, WITHOUT LONG-TERM CURRENT USE OF INSULIN (HCC): ICD-10-CM

## 2024-04-02 NOTE — TELEPHONE ENCOUNTER
Per Dr Solorio advice Metformin 2 tablets twice a day sent to his local pharmacy for one month supply no refill appointment schedule for 04/16 for BP check and Rx refill patient's son advice to bring his father fasting on 04/16   Detail Level: Simple Quality 110: Preventive Care And Screening: Influenza Immunization: Influenza Immunization not Administered for Documented Reasons. Additional Notes: Patient chooses not to get flu shot

## 2024-04-15 ENCOUNTER — TELEPHONE (OUTPATIENT)
Dept: FAMILY MEDICINE CLINIC | Facility: CLINIC | Age: 74
End: 2024-04-15

## 2024-04-15 NOTE — TELEPHONE ENCOUNTER
Son called asking if patient's appointment is an appointment or a lab appointment. Told him it's a office visit that he will see Dr. Solorio. Son notified patient needs to come fasting to this appointment per arturo.

## 2024-04-16 ENCOUNTER — OFFICE VISIT (OUTPATIENT)
Dept: FAMILY MEDICINE CLINIC | Facility: CLINIC | Age: 74
End: 2024-04-16
Payer: COMMERCIAL

## 2024-04-16 VITALS
WEIGHT: 186 LBS | SYSTOLIC BLOOD PRESSURE: 98 MMHG | HEART RATE: 69 BPM | OXYGEN SATURATION: 99 % | BODY MASS INDEX: 25.19 KG/M2 | DIASTOLIC BLOOD PRESSURE: 68 MMHG | HEIGHT: 72 IN

## 2024-04-16 DIAGNOSIS — E55.9 VITAMIN D DEFICIENCY: ICD-10-CM

## 2024-04-16 DIAGNOSIS — I10 ESSENTIAL HYPERTENSION: Primary | ICD-10-CM

## 2024-04-16 DIAGNOSIS — E78.2 MIXED HYPERLIPIDEMIA: ICD-10-CM

## 2024-04-16 DIAGNOSIS — J44.9 CHRONIC OBSTRUCTIVE PULMONARY DISEASE, UNSPECIFIED COPD TYPE (HCC): ICD-10-CM

## 2024-04-16 DIAGNOSIS — I50.42 CHRONIC COMBINED SYSTOLIC AND DIASTOLIC CONGESTIVE HEART FAILURE (HCC): ICD-10-CM

## 2024-04-16 DIAGNOSIS — E11.9 TYPE 2 DIABETES MELLITUS WITHOUT COMPLICATION, WITHOUT LONG-TERM CURRENT USE OF INSULIN (HCC): ICD-10-CM

## 2024-04-16 DIAGNOSIS — J30.89 ENVIRONMENTAL AND SEASONAL ALLERGIES: ICD-10-CM

## 2024-04-16 DIAGNOSIS — K21.9 GASTROESOPHAGEAL REFLUX DISEASE WITHOUT ESOPHAGITIS: ICD-10-CM

## 2024-04-16 DIAGNOSIS — Z12.5 SPECIAL SCREENING FOR MALIGNANT NEOPLASM OF PROSTATE: ICD-10-CM

## 2024-04-16 DIAGNOSIS — I48.91 ATRIAL FIBRILLATION WITH RVR (HCC): ICD-10-CM

## 2024-04-16 LAB
ALBUMIN SERPL-MCNC: 4.3 G/DL (ref 3.2–4.6)
ALBUMIN/GLOB SERPL: 1.6 (ref 0.4–1.6)
ALP SERPL-CCNC: 106 U/L (ref 50–136)
ALT SERPL-CCNC: 40 U/L (ref 12–65)
ANION GAP SERPL CALC-SCNC: 2 MMOL/L (ref 2–11)
AST SERPL-CCNC: 18 U/L (ref 15–37)
BASOPHILS # BLD: 0.1 K/UL (ref 0–0.2)
BASOPHILS NFR BLD: 1 % (ref 0–2)
BILIRUB SERPL-MCNC: 0.8 MG/DL (ref 0.2–1.1)
BILIRUBIN, URINE, POC: NEGATIVE
BLOOD URINE, POC: NEGATIVE
BUN SERPL-MCNC: 9 MG/DL (ref 8–23)
CALCIUM SERPL-MCNC: 9.5 MG/DL (ref 8.3–10.4)
CHLORIDE SERPL-SCNC: 100 MMOL/L (ref 103–113)
CHOLEST SERPL-MCNC: 128 MG/DL
CO2 SERPL-SCNC: 33 MMOL/L (ref 21–32)
CREAT SERPL-MCNC: 0.9 MG/DL (ref 0.8–1.5)
DIFFERENTIAL METHOD BLD: ABNORMAL
EOSINOPHIL # BLD: 0.2 K/UL (ref 0–0.8)
EOSINOPHIL NFR BLD: 2 % (ref 0.5–7.8)
ERYTHROCYTE [DISTWIDTH] IN BLOOD BY AUTOMATED COUNT: 19.8 % (ref 11.9–14.6)
GLOBULIN SER CALC-MCNC: 2.7 G/DL (ref 2.8–4.5)
GLUCOSE SERPL-MCNC: 184 MG/DL (ref 65–100)
GLUCOSE URINE, POC: NEGATIVE
HCT VFR BLD AUTO: 34.5 % (ref 41.1–50.3)
HDLC SERPL-MCNC: 86 MG/DL (ref 40–60)
HDLC SERPL: 1.5
HGB BLD-MCNC: 9.4 G/DL (ref 13.6–17.2)
IMM GRANULOCYTES # BLD AUTO: 0 K/UL (ref 0–0.5)
IMM GRANULOCYTES NFR BLD AUTO: 0 % (ref 0–5)
KETONES, URINE, POC: NEGATIVE
LDLC SERPL CALC-MCNC: 24 MG/DL
LEUKOCYTE ESTERASE, URINE, POC: ABNORMAL
LYMPHOCYTES # BLD: 1.7 K/UL (ref 0.5–4.6)
LYMPHOCYTES NFR BLD: 16 % (ref 13–44)
MCH RBC QN AUTO: 20.1 PG (ref 26.1–32.9)
MCHC RBC AUTO-ENTMCNC: 27.2 G/DL (ref 31.4–35)
MCV RBC AUTO: 73.9 FL (ref 82–102)
MICROALB/CREAT RATIO, POC: ABNORMAL
MICROALBUMIN URINE, POC: 50 MG/L
MONOCYTES # BLD: 0.6 K/UL (ref 0.1–1.3)
MONOCYTES NFR BLD: 6 % (ref 4–12)
NEUTS SEG # BLD: 7.9 K/UL (ref 1.7–8.2)
NEUTS SEG NFR BLD: 75 % (ref 43–78)
NITRITE, URINE, POC: NEGATIVE
NRBC # BLD: 0 K/UL (ref 0–0.2)
PH, URINE, POC: 6 (ref 4.6–8)
PLATELET # BLD AUTO: 316 K/UL (ref 150–450)
PMV BLD AUTO: 11.1 FL (ref 9.4–12.3)
POTASSIUM SERPL-SCNC: 4.4 MMOL/L (ref 3.5–5.1)
PROT SERPL-MCNC: 7 G/DL (ref 6.3–8.2)
PROTEIN,URINE, POC: NEGATIVE
PSA SERPL-MCNC: 5.7 NG/ML
RBC # BLD AUTO: 4.67 M/UL (ref 4.23–5.6)
SODIUM SERPL-SCNC: 135 MMOL/L (ref 136–146)
SPECIFIC GRAVITY, URINE, POC: 1.01 (ref 1–1.03)
TRIGL SERPL-MCNC: 90 MG/DL (ref 35–150)
URINALYSIS CLARITY, POC: ABNORMAL
URINALYSIS COLOR, POC: YELLOW
UROBILINOGEN, POC: NORMAL
VLDLC SERPL CALC-MCNC: 18 MG/DL (ref 6–23)
WBC # BLD AUTO: 10.5 K/UL (ref 4.3–11.1)

## 2024-04-16 PROCEDURE — 99213 OFFICE O/P EST LOW 20 MIN: CPT | Performed by: FAMILY MEDICINE

## 2024-04-16 PROCEDURE — 1123F ACP DISCUSS/DSCN MKR DOCD: CPT | Performed by: FAMILY MEDICINE

## 2024-04-16 PROCEDURE — 3078F DIAST BP <80 MM HG: CPT | Performed by: FAMILY MEDICINE

## 2024-04-16 PROCEDURE — 82043 UR ALBUMIN QUANTITATIVE: CPT | Performed by: FAMILY MEDICINE

## 2024-04-16 PROCEDURE — 81003 URINALYSIS AUTO W/O SCOPE: CPT | Performed by: FAMILY MEDICINE

## 2024-04-16 PROCEDURE — 3074F SYST BP LT 130 MM HG: CPT | Performed by: FAMILY MEDICINE

## 2024-04-16 RX ORDER — ESOMEPRAZOLE MAGNESIUM 40 MG/1
40 CAPSULE, DELAYED RELEASE ORAL DAILY
Qty: 90 CAPSULE | Refills: 3 | Status: SHIPPED | OUTPATIENT
Start: 2024-04-16

## 2024-04-16 RX ORDER — CETIRIZINE HYDROCHLORIDE 5 MG/1
5 TABLET ORAL DAILY
Qty: 90 TABLET | Refills: 3 | Status: SHIPPED | OUTPATIENT
Start: 2024-04-16

## 2024-04-16 RX ORDER — ALBUTEROL SULFATE 2.5 MG/3ML
2.5 SOLUTION RESPIRATORY (INHALATION) 4 TIMES DAILY
Qty: 540 ML | Refills: 3 | Status: SHIPPED | OUTPATIENT
Start: 2024-04-16

## 2024-04-16 RX ORDER — ONDANSETRON 4 MG/1
4 TABLET, ORALLY DISINTEGRATING ORAL EVERY 8 HOURS PRN
Qty: 30 TABLET | Refills: 1 | Status: SHIPPED | OUTPATIENT
Start: 2024-04-16 | End: 2024-04-16

## 2024-04-16 RX ORDER — ERGOCALCIFEROL 1.25 MG/1
50000 CAPSULE ORAL
Qty: 13 CAPSULE | Refills: 4 | Status: SHIPPED | OUTPATIENT
Start: 2024-04-16

## 2024-04-16 RX ORDER — PREDNISONE 1 MG/1
TABLET ORAL
COMMUNITY
Start: 2024-03-14

## 2024-04-16 RX ORDER — GLIPIZIDE 10 MG/1
10 TABLET, FILM COATED, EXTENDED RELEASE ORAL DAILY
Qty: 90 TABLET | Refills: 3 | Status: SHIPPED | OUTPATIENT
Start: 2024-04-16 | End: 2025-04-11

## 2024-04-16 RX ORDER — AZITHROMYCIN 500 MG/1
TABLET, FILM COATED ORAL
COMMUNITY
Start: 2024-04-06

## 2024-04-16 ASSESSMENT — ENCOUNTER SYMPTOMS
VOMITING: 0
SHORTNESS OF BREATH: 0
NAUSEA: 0

## 2024-04-16 NOTE — PROGRESS NOTES
tablet 3    azithromycin (ZITHROMAX) 250 MG tablet 2 tablets      OXYGEN Use as instructed. Use as instructed. DME: [facility] 2-3lpm continuously      acetaminophen (TYLENOL) 500 MG tablet Take 2 tablets by mouth daily as needed      albuterol sulfate  (90 Base) MCG/ACT inhaler Inhale 2 puffs into the lungs every 4 hours as needed      Fluticasone-Umeclidin-Vilant (TRELEGY ELLIPTA) 200-62.5-25 MCG/INH AEPB INHALE 1 PUFF ONCE DAILY      mirtazapine (REMERON) 15 MG tablet Take 1 tablet by mouth      warfarin (COUMADIN) 10 MG tablet Take 1 tablet by mouth daily 7.5 Monday and rest of days 5mg.      warfarin (COUMADIN) 5 MG tablet Take 1 tablet by mouth daily       No current facility-administered medications for this visit.     Allergies   Allergen Reactions    Azithromycin Other (See Comments)     EKG changes  Other reaction(s): Other- (not listed) - Allergy  EKG changes, including Vtach.    Erythromycin Other (See Comments)     Other reaction(s): Other- (not listed) - Allergy  EKG changes     Patient Active Problem List   Diagnosis    Atrial fibrillation with RVR (HCC)    Hypercoagulable state (HCC)    Malnutrition (HCC)    Acute vascular disorder of intestine (HCC)    Paroxysmal atrial fibrillation (HCC)    Centrilobular emphysema (HCC)    Tobacco use    Elevated troponin    Abnormal weight loss    Vitamin D deficiency    Cardiomyopathy (HCC)    Mixed hyperlipidemia    Abnormal findings on diagnostic imaging of other specified body structures    Long term current use of anticoagulant therapy    Essential hypertension    Chronic combined systolic and diastolic congestive heart failure (HCC)    Mesenteric ischemia, chronic (HCC)    Acute on chronic respiratory failure with hypoxemia (HCC)    Gastroesophageal reflux disease without esophagitis    Hypoxemia    Diabetes mellitus type II, controlled, with no complications (HCC)    COPD (chronic obstructive pulmonary disease) ([facility])    H/O ischemic bowel disease

## 2024-04-17 LAB
EST. AVERAGE GLUCOSE BLD GHB EST-MCNC: 148 MG/DL
HBA1C MFR BLD: 6.8 % (ref 4.8–5.6)

## 2024-04-26 ENCOUNTER — TELEMEDICINE (OUTPATIENT)
Dept: FAMILY MEDICINE CLINIC | Facility: CLINIC | Age: 74
End: 2024-04-26
Payer: COMMERCIAL

## 2024-04-26 DIAGNOSIS — D64.9 ANEMIA, UNSPECIFIED TYPE: ICD-10-CM

## 2024-04-26 DIAGNOSIS — R97.20 ELEVATED PSA: ICD-10-CM

## 2024-04-26 DIAGNOSIS — I10 ESSENTIAL HYPERTENSION: ICD-10-CM

## 2024-04-26 DIAGNOSIS — E78.2 MIXED HYPERLIPIDEMIA: ICD-10-CM

## 2024-04-26 DIAGNOSIS — E11.9 TYPE 2 DIABETES MELLITUS WITHOUT COMPLICATION, WITHOUT LONG-TERM CURRENT USE OF INSULIN (HCC): Primary | ICD-10-CM

## 2024-04-26 DIAGNOSIS — Z12.11 SPECIAL SCREENING FOR MALIGNANT NEOPLASMS, COLON: ICD-10-CM

## 2024-04-26 PROCEDURE — 99443 PR PHYS/QHP TELEPHONE EVALUATION 21-30 MIN: CPT | Performed by: FAMILY MEDICINE

## 2024-04-26 ASSESSMENT — ENCOUNTER SYMPTOMS
SHORTNESS OF BREATH: 0
NAUSEA: 0
VOMITING: 0

## 2024-04-26 NOTE — PROGRESS NOTES
PROGRESS NOTE    SUBJECTIVE:   Gianni De La O is a 73 y.o. male seen for a follow up visit regarding the following chief complaint:     Chief Complaint   Patient presents with    Follow-up           HPI patient is doing a phone call visit to go over his lab results without any new complaintsDavimeryl De La O is a 73 y.o. male evaluated via telephone on 4/26/2024 for Follow-up  .        Total Time: minutes: 21-30 minutes    Gianni De La O was evaluated through a synchronous (real-time) audio encounter. Patient identification was verified at the start of the visit. He (or guardian if applicable) is aware that this is a billable service, which includes applicable co-pays. This visit was conducted with the patient's (and/or legal guardian's) verbal consent. He has not had a related appointment within my department in the past 7 days or scheduled within the next 24 hours.   The patient was located at Home: [address] [place] [address].  The provider was located at Facility (Appt Dept): [address] [place] [address]  [address] [phone].    Note: not billable if this call serves to triage the patient into an appointment for the relevant concern         Past Medical History, Past Surgical History, Family history, Social History, and Medications were all reviewed with the patient today and updated as necessary.       Current Outpatient Medications   Medication Sig Dispense Refill    azithromycin (ZITHROMAX) 500 MG tablet TAKE 1 TABLET BY MOUTH THREE TIMES A WEEK EVERY MONDAY AND EVERY WEDNESDAY AND EVERY FRIDAY      metFORMIN (GLUCOPHAGE) 500 MG tablet Take 2 tablets by mouth 2 times daily (with meals) TAKE 2TABLET BY MOUTH TWICE DAILY (WITH MEALS)TAKE 2TABLET BY MOUTH TWICE DAILY (WITH MEALS) 360 tablet 3    glipiZIDE (GLUCOTROL XL) 10 MG extended release tablet Take 1 tablet by mouth daily 90 tablet 3    esomeprazole (NEXIUM) 40 MG delayed release capsule Take 1 capsule by mouth daily 90 capsule 3

## 2024-05-08 DIAGNOSIS — R97.20 ELEVATED PSA: Primary | ICD-10-CM

## 2024-05-09 ENCOUNTER — NURSE ONLY (OUTPATIENT)
Dept: FAMILY MEDICINE CLINIC | Facility: CLINIC | Age: 74
End: 2024-05-09
Payer: COMMERCIAL

## 2024-05-09 DIAGNOSIS — D64.9 ANEMIA, UNSPECIFIED TYPE: ICD-10-CM

## 2024-05-09 LAB
FERRITIN SERPL-MCNC: 11 NG/ML (ref 8–388)
IRON SERPL-MCNC: 25 UG/DL (ref 35–100)
TRANSFERRIN SERPL-MCNC: 440 MG/DL (ref 200–360)
VIT B12 SERPL-MCNC: 226 PG/ML (ref 193–986)

## 2024-05-09 PROCEDURE — 36415 COLL VENOUS BLD VENIPUNCTURE: CPT | Performed by: FAMILY MEDICINE

## 2024-05-13 ENCOUNTER — OFFICE VISIT (OUTPATIENT)
Dept: ONCOLOGY | Age: 74
End: 2024-05-13
Payer: COMMERCIAL

## 2024-05-13 VITALS
HEIGHT: 71 IN | BODY MASS INDEX: 26.4 KG/M2 | WEIGHT: 188.6 LBS | HEART RATE: 94 BPM | OXYGEN SATURATION: 96 % | SYSTOLIC BLOOD PRESSURE: 126 MMHG | DIASTOLIC BLOOD PRESSURE: 81 MMHG

## 2024-05-13 DIAGNOSIS — R97.20 ELEVATED PSA: Primary | ICD-10-CM

## 2024-05-13 PROCEDURE — 99203 OFFICE O/P NEW LOW 30 MIN: CPT | Performed by: UROLOGY

## 2024-05-13 PROCEDURE — 1123F ACP DISCUSS/DSCN MKR DOCD: CPT | Performed by: UROLOGY

## 2024-05-13 PROCEDURE — 3078F DIAST BP <80 MM HG: CPT | Performed by: UROLOGY

## 2024-05-13 PROCEDURE — 3074F SYST BP LT 130 MM HG: CPT | Performed by: UROLOGY

## 2024-05-13 ASSESSMENT — PATIENT HEALTH QUESTIONNAIRE - PHQ9
SUM OF ALL RESPONSES TO PHQ QUESTIONS 1-9: 0
2. FEELING DOWN, DEPRESSED OR HOPELESS: NOT AT ALL
1. LITTLE INTEREST OR PLEASURE IN DOING THINGS: NOT AT ALL
SUM OF ALL RESPONSES TO PHQ QUESTIONS 1-9: 0
SUM OF ALL RESPONSES TO PHQ QUESTIONS 1-9: 0
SUM OF ALL RESPONSES TO PHQ9 QUESTIONS 1 & 2: 0
SUM OF ALL RESPONSES TO PHQ QUESTIONS 1-9: 0

## 2024-05-13 NOTE — PATIENT INSTRUCTIONS
We reviewed the significance of an elevated PSA.  We discussed benign etiologies such as; benign enlargement of the prostate, inflammation of the prostate, infections of the prostate, and prostatic manipulation as a possible cause. We also discussed the possibility of prostate cancer.  We explained that the main ways to diagnosis prostate cancer are with an ultrasound-guided biopsy or a prostate MRI followed by a biopsy. We explained that the MRI gives a view of the entire prostate and would possibly enable a targeted biopsy if an abnormality was seen on the MRI.  Otherwise, we could just manage the patient with observation and repeat PSA checks. After a full discussion regarding the potential risks, benefits, and treatment alternatives, we will plan to repeat labs with follow up in 6 months.

## 2024-05-13 NOTE — PROGRESS NOTES
NEW PATIENT INTAKE    Referral Diagnosis: Elevated PSA      Referring Provider: Eldon Solorio DO    Primary Care Provider: Eldon Solorio DO     Family History of Cancer/ Hematology Disorders: Mother, sister, and brother with unknown cancers.    Presenting Symptoms: Elevated PSA    Chronological History of Pertinent Events:     74yo white male    PMH: DM II, HTN, GERD, Vit D deficiency, A-fib with RVT, CHF, COPD, Mixed HLD    4/16/24 - Met with PCP ([facility])  Here for f/u; patient has missed several appointments due to being in the hospital, missed his physical and needing medication refills.  Abnormal labs: (4/16/24 - [facility])  PSA - 5.7  Sodium - 135  Chloride - 100  CO2 - 33  Glucose - 184  HDL Chol - 86  Globulin - 2.7  Hgb A1C - 6.8  Hgb - 9.4  Hct - 34.5  MCV - 73.9  MCH - 20.1  MCHC - 27.2  RDW - 19.8    4/26/24 - VV with PCP ([facility])  Follow-up visit to review labs  Referrals placed: Ophthalmology, [facility] for colonoscopy, and Urology due to elevated PSA.    A referral has been placed with [facility] for a Medical Urologic/Oncology consultation and treatment.      Notes from Referring Provider: N/A    Presented at Tumor Board: No    Other Pertinent Information: N/A

## 2024-05-13 NOTE — PROGRESS NOTES
Urologic Oncology  [facility]  [address] [place] [id number]  [phone]          Gianni De La O  : 1950      INITIAL EVALUATION    Chief Complaint   Patient presents with    New Patient       HPI:     Gianni De La O is a 73 y.o. male who is referred for elevated PSA.  He had routine screening PSA on 2024 that resulted 5.7.  This had previously been 4.0 in 2021, 4.5 in 2021, and 3.7 in .    Patient endorses urinary frequency and nocturia stating he often gets up 4-6 times per night to void.  He otherwise denies dysuria, hematuria.    Patient denies family history of prostate cancer or other malignancies.    Patient has significant medical comorbidities including CHF, COPD, acute on chronic respiratory failure requiring chronic oxygen use, history of ischemic bowel disease on Coumadin, A-fib, hypertension hyperlipidemia, among others.    PMH:     Past Medical History:   Diagnosis Date    Abnormal weight loss 2016    Acute vascular disorder of intestine (HCC) 2016    Chronic lung disease     COPD (chronic obstructive pulmonary disease) (HCC) 2016    Diabetes mellitus type II, controlled, with no complications (HCC) 2016    Elevated troponin 2016    Tobacco use 2016    Vitamin D deficiency 2018       PSH:    Past Surgical History:   Procedure Laterality Date    APPENDECTOMY  2009    CYST REMOVAL Right     Right shoulder    HERNIA REPAIR  2005    LAPAROSCOPY  2015    Dr. Ford    LAPAROTOMY  2015    Washout & abd closure-Dr. East    OTHER SURGICAL HISTORY  2015    Peg Tube Placement-Dr. Ford    SMALL INTESTINE SURGERY  2015    Abd washout & skin closure-Dr. Herndon    SMALL INTESTINE SURGERY  2015    Abd washout & vac placement-Dr. Velasco       MEDs:    Current Outpatient Medications   Medication Sig Dispense Refill    azithromycin (ZITHROMAX) 500 MG tablet TAKE 1 TABLET

## 2024-06-03 ENCOUNTER — TELEPHONE (OUTPATIENT)
Dept: FAMILY MEDICINE CLINIC | Facility: CLINIC | Age: 74
End: 2024-06-03

## 2024-06-03 NOTE — TELEPHONE ENCOUNTER
Called patient's home number twice. No answer and no voicemail box. I called Nasir and spoke with him. He states that I need to call patient's home phone again. I notified nasir that there is no voicemail box set up. Nasir states, yes we haven't set it up yet. I notified nasir that we would unfortunately have to no show patient if he doesn't answer. Nasir verbalized understanding and hung up his phone.

## 2024-06-19 ENCOUNTER — OFFICE VISIT (OUTPATIENT)
Dept: FAMILY MEDICINE CLINIC | Facility: CLINIC | Age: 74
End: 2024-06-19
Payer: COMMERCIAL

## 2024-06-19 VITALS
OXYGEN SATURATION: 99 % | RESPIRATION RATE: 18 BRPM | DIASTOLIC BLOOD PRESSURE: 90 MMHG | SYSTOLIC BLOOD PRESSURE: 120 MMHG | BODY MASS INDEX: 26.18 KG/M2 | TEMPERATURE: 98.2 F | WEIGHT: 187 LBS | HEART RATE: 86 BPM | HEIGHT: 71 IN

## 2024-06-19 DIAGNOSIS — Z72.0 TOBACCO USE: ICD-10-CM

## 2024-06-19 DIAGNOSIS — K21.9 GASTROESOPHAGEAL REFLUX DISEASE WITHOUT ESOPHAGITIS: ICD-10-CM

## 2024-06-19 DIAGNOSIS — D68.59 HYPERCOAGULABLE STATE (HCC): ICD-10-CM

## 2024-06-19 DIAGNOSIS — R11.0 CHRONIC NAUSEA: ICD-10-CM

## 2024-06-19 DIAGNOSIS — D64.9 ANEMIA, UNSPECIFIED TYPE: ICD-10-CM

## 2024-06-19 DIAGNOSIS — E55.9 VITAMIN D DEFICIENCY: ICD-10-CM

## 2024-06-19 DIAGNOSIS — I10 ESSENTIAL HYPERTENSION: ICD-10-CM

## 2024-06-19 DIAGNOSIS — Z13.31 SCREENING FOR DEPRESSION: ICD-10-CM

## 2024-06-19 DIAGNOSIS — E11.9 TYPE 2 DIABETES MELLITUS WITHOUT COMPLICATION, WITHOUT LONG-TERM CURRENT USE OF INSULIN (HCC): ICD-10-CM

## 2024-06-19 DIAGNOSIS — Z00.00 MEDICARE ANNUAL WELLNESS VISIT, SUBSEQUENT: Primary | ICD-10-CM

## 2024-06-19 DIAGNOSIS — I74.9 EMBOLISM (HCC): ICD-10-CM

## 2024-06-19 DIAGNOSIS — Z00.00 ROUTINE GENERAL MEDICAL EXAMINATION AT A HEALTH CARE FACILITY: ICD-10-CM

## 2024-06-19 DIAGNOSIS — I42.9 CARDIOMYOPATHY, UNSPECIFIED TYPE (HCC): ICD-10-CM

## 2024-06-19 DIAGNOSIS — Z12.5 SPECIAL SCREENING FOR MALIGNANT NEOPLASM OF PROSTATE: ICD-10-CM

## 2024-06-19 DIAGNOSIS — F33.42 MAJOR DEPRESSIVE DISORDER, RECURRENT, IN FULL REMISSION (HCC): ICD-10-CM

## 2024-06-19 DIAGNOSIS — R97.20 ELEVATED PSA: ICD-10-CM

## 2024-06-19 DIAGNOSIS — J44.9 CHRONIC OBSTRUCTIVE PULMONARY DISEASE, UNSPECIFIED COPD TYPE (HCC): ICD-10-CM

## 2024-06-19 DIAGNOSIS — J96.21 ACUTE ON CHRONIC RESPIRATORY FAILURE WITH HYPOXEMIA (HCC): ICD-10-CM

## 2024-06-19 DIAGNOSIS — E44.1 MILD PROTEIN-CALORIE MALNUTRITION (HCC): ICD-10-CM

## 2024-06-19 DIAGNOSIS — E78.2 MIXED HYPERLIPIDEMIA: ICD-10-CM

## 2024-06-19 PROCEDURE — 3074F SYST BP LT 130 MM HG: CPT | Performed by: FAMILY MEDICINE

## 2024-06-19 PROCEDURE — G0439 PPPS, SUBSEQ VISIT: HCPCS | Performed by: FAMILY MEDICINE

## 2024-06-19 PROCEDURE — 3044F HG A1C LEVEL LT 7.0%: CPT | Performed by: FAMILY MEDICINE

## 2024-06-19 PROCEDURE — 3080F DIAST BP >= 90 MM HG: CPT | Performed by: FAMILY MEDICINE

## 2024-06-19 PROCEDURE — 1123F ACP DISCUSS/DSCN MKR DOCD: CPT | Performed by: FAMILY MEDICINE

## 2024-06-19 RX ORDER — ONDANSETRON 4 MG/1
4 TABLET, FILM COATED ORAL EVERY 8 HOURS PRN
Qty: 30 TABLET | Refills: 0 | Status: SHIPPED | OUTPATIENT
Start: 2024-06-19

## 2024-06-19 RX ORDER — ONDANSETRON 4 MG/1
4 TABLET, FILM COATED ORAL EVERY 8 HOURS PRN
COMMUNITY
End: 2024-06-19 | Stop reason: SDUPTHER

## 2024-06-19 RX ORDER — PREDNISONE 1 MG/1
1 TABLET ORAL DAILY
COMMUNITY
Start: 2024-06-13 | End: 2024-08-20

## 2024-06-19 SDOH — ECONOMIC STABILITY: FOOD INSECURITY: WITHIN THE PAST 12 MONTHS, YOU WORRIED THAT YOUR FOOD WOULD RUN OUT BEFORE YOU GOT MONEY TO BUY MORE.: NEVER TRUE

## 2024-06-19 SDOH — ECONOMIC STABILITY: FOOD INSECURITY: WITHIN THE PAST 12 MONTHS, THE FOOD YOU BOUGHT JUST DIDN'T LAST AND YOU DIDN'T HAVE MONEY TO GET MORE.: NEVER TRUE

## 2024-06-19 SDOH — ECONOMIC STABILITY: INCOME INSECURITY: HOW HARD IS IT FOR YOU TO PAY FOR THE VERY BASICS LIKE FOOD, HOUSING, MEDICAL CARE, AND HEATING?: NOT HARD AT ALL

## 2024-06-19 ASSESSMENT — PATIENT HEALTH QUESTIONNAIRE - PHQ9
3. TROUBLE FALLING OR STAYING ASLEEP: SEVERAL DAYS
6. FEELING BAD ABOUT YOURSELF - OR THAT YOU ARE A FAILURE OR HAVE LET YOURSELF OR YOUR FAMILY DOWN: NOT AT ALL
4. FEELING TIRED OR HAVING LITTLE ENERGY: NOT AT ALL
SUM OF ALL RESPONSES TO PHQ QUESTIONS 1-9: 1
SUM OF ALL RESPONSES TO PHQ9 QUESTIONS 1 & 2: 0
1. LITTLE INTEREST OR PLEASURE IN DOING THINGS: NOT AT ALL
2. FEELING DOWN, DEPRESSED OR HOPELESS: NOT AT ALL
5. POOR APPETITE OR OVEREATING: NOT AT ALL
SUM OF ALL RESPONSES TO PHQ QUESTIONS 1-9: 1
10. IF YOU CHECKED OFF ANY PROBLEMS, HOW DIFFICULT HAVE THESE PROBLEMS MADE IT FOR YOU TO DO YOUR WORK, TAKE CARE OF THINGS AT HOME, OR GET ALONG WITH OTHER PEOPLE: SOMEWHAT DIFFICULT
9. THOUGHTS THAT YOU WOULD BE BETTER OFF DEAD, OR OF HURTING YOURSELF: NOT AT ALL
8. MOVING OR SPEAKING SO SLOWLY THAT OTHER PEOPLE COULD HAVE NOTICED. OR THE OPPOSITE, BEING SO FIGETY OR RESTLESS THAT YOU HAVE BEEN MOVING AROUND A LOT MORE THAN USUAL: NOT AT ALL
7. TROUBLE CONCENTRATING ON THINGS, SUCH AS READING THE NEWSPAPER OR WATCHING TELEVISION: NOT AT ALL
SUM OF ALL RESPONSES TO PHQ QUESTIONS 1-9: 1
SUM OF ALL RESPONSES TO PHQ QUESTIONS 1-9: 1

## 2024-06-19 NOTE — PATIENT INSTRUCTIONS
1-2 years to screen for glaucoma; cataracts, macular degeneration, and other eye disorders.  A preventive dental visit is recommended every 6 months.  Try to get at least 150 minutes of exercise per week or 10,000 steps per day on a pedometer .  Order or download the FREE \"Exercise & Physical Activity: Your Everyday Guide\" from [employer]. Call 1-902.944.4635 or search [employer] online.  You need 4765-9977 mg of calcium and 3386-9460 IU of vitamin D per day. It is possible to meet your calcium requirement with diet alone, but a vitamin D supplement is usually necessary to meet this goal.  When exposed to the sun, use a sunscreen that protects against both UVA and UVB radiation with an SPF of 30 or greater. Reapply every 2 to 3 hours or after sweating, drying off with a towel, or swimming.  Always wear a seat belt when traveling in a car. Always wear a helmet when riding a bicycle or motorcycle.

## 2024-06-19 NOTE — PROGRESS NOTES
Medicare Annual Wellness Visit    Gianni De La O is here for Medicare AWV    Assessment & Plan   Medicare annual wellness visit, subsequent  Major depressive disorder, recurrent, in full remission (HCC)  Embolism (HCC)  Mild protein-calorie malnutrition (HCC)  Acute on chronic respiratory failure with hypoxemia (HCC)  Hypercoagulable state (HCC)  Routine general medical examination at a health care facility  Screening for depression  Type 2 diabetes mellitus without complication, without long-term current use of insulin (HCC)  -     Lipid Panel; Future  -     Hemoglobin A1C; Future  -     Microalbumin / Creatinine Urine Ratio; Future  Anemia, unspecified type  Essential hypertension  -     Lipid Panel; Future  -     Comprehensive Metabolic Panel; Future  -     CBC with Auto Differential; Future  -     AMB POC URINALYSIS DIP STICK AUTO W/O MICRO; Future  Mixed hyperlipidemia  -     Lipid Panel; Future  Vitamin D deficiency  -     Vitamin D 25 Hydroxy; Future  Chronic obstructive pulmonary disease, unspecified COPD type (HCC)  Gastroesophageal reflux disease without esophagitis  Elevated PSA  -     PSA Screening; Future  Tobacco use  Cardiomyopathy, unspecified type (HCC)  -     Lipid Panel; Future  Special screening for malignant neoplasm of prostate  -     PSA Screening; Future    Recommendations for Preventive Services Due: see orders and patient instructions/AVS.  Recommended screening schedule for the next 5-10 years is provided to the patient in written form: see Patient Instructions/AVS.     No follow-ups on file.     Subjective   Patient presents office today for complete physical/Medicare wellness complaining of chronic nausea and always asking for Zofran has never been told why he has chronic nausea    Patient's complete Health Risk Assessment and screening values have been reviewed and are found in Flowsheets. The following problems were reviewed today and where indicated follow up appointments were

## 2024-06-25 ENCOUNTER — NURSE ONLY (OUTPATIENT)
Dept: FAMILY MEDICINE CLINIC | Facility: CLINIC | Age: 74
End: 2024-06-25
Payer: COMMERCIAL

## 2024-06-25 DIAGNOSIS — E78.2 MIXED HYPERLIPIDEMIA: ICD-10-CM

## 2024-06-25 DIAGNOSIS — I42.9 CARDIOMYOPATHY, UNSPECIFIED TYPE (HCC): ICD-10-CM

## 2024-06-25 DIAGNOSIS — E55.9 VITAMIN D DEFICIENCY: ICD-10-CM

## 2024-06-25 DIAGNOSIS — E11.9 TYPE 2 DIABETES MELLITUS WITHOUT COMPLICATION, WITHOUT LONG-TERM CURRENT USE OF INSULIN (HCC): ICD-10-CM

## 2024-06-25 DIAGNOSIS — I10 ESSENTIAL HYPERTENSION: ICD-10-CM

## 2024-06-25 LAB
25(OH)D3 SERPL-MCNC: 69 NG/ML (ref 30–100)
ALBUMIN SERPL-MCNC: 3.9 G/DL (ref 3.2–4.6)
ALBUMIN/GLOB SERPL: 1.7 (ref 1–1.9)
ALP SERPL-CCNC: 95 U/L (ref 40–129)
ALT SERPL-CCNC: 36 U/L (ref 12–65)
ANION GAP SERPL CALC-SCNC: 11 MMOL/L (ref 9–18)
AST SERPL-CCNC: 26 U/L (ref 15–37)
BASOPHILS # BLD: 0.1 K/UL (ref 0–0.2)
BASOPHILS NFR BLD: 1 % (ref 0–2)
BILIRUB SERPL-MCNC: 0.6 MG/DL (ref 0–1.2)
BILIRUBIN, URINE, POC: NEGATIVE
BLOOD URINE, POC: NEGATIVE
BUN SERPL-MCNC: 13 MG/DL (ref 8–23)
CALCIUM SERPL-MCNC: 9.1 MG/DL (ref 8.8–10.2)
CHLORIDE SERPL-SCNC: 104 MMOL/L (ref 98–107)
CHOLEST SERPL-MCNC: 131 MG/DL (ref 0–200)
CO2 SERPL-SCNC: 29 MMOL/L (ref 20–28)
CREAT SERPL-MCNC: 0.82 MG/DL (ref 0.8–1.3)
CREAT UR-MCNC: 136 MG/DL (ref 39–259)
DIFFERENTIAL METHOD BLD: ABNORMAL
EOSINOPHIL # BLD: 0.2 K/UL (ref 0–0.8)
EOSINOPHIL NFR BLD: 2 % (ref 0.5–7.8)
ERYTHROCYTE [DISTWIDTH] IN BLOOD BY AUTOMATED COUNT: 19.9 % (ref 11.9–14.6)
EST. AVERAGE GLUCOSE BLD GHB EST-MCNC: 190 MG/DL
GLOBULIN SER CALC-MCNC: 2.3 G/DL (ref 2.3–3.5)
GLUCOSE SERPL-MCNC: 165 MG/DL (ref 70–99)
GLUCOSE URINE, POC: NEGATIVE
HBA1C MFR BLD: 8.2 % (ref 0–5.6)
HCT VFR BLD AUTO: 34 % (ref 41.1–50.3)
HDLC SERPL-MCNC: 80 MG/DL (ref 40–60)
HDLC SERPL: 1.6 (ref 0–5)
HGB BLD-MCNC: 9.2 G/DL (ref 13.6–17.2)
IMM GRANULOCYTES # BLD AUTO: 0 K/UL (ref 0–0.5)
IMM GRANULOCYTES NFR BLD AUTO: 0 % (ref 0–5)
KETONES, URINE, POC: NEGATIVE
LDLC SERPL CALC-MCNC: 29 MG/DL (ref 0–100)
LEUKOCYTE ESTERASE, URINE, POC: ABNORMAL
LYMPHOCYTES # BLD: 1.4 K/UL (ref 0.5–4.6)
LYMPHOCYTES NFR BLD: 14 % (ref 13–44)
MCH RBC QN AUTO: 20.1 PG (ref 26.1–32.9)
MCHC RBC AUTO-ENTMCNC: 27.1 G/DL (ref 31.4–35)
MCV RBC AUTO: 74.4 FL (ref 82–102)
MICROALBUMIN UR-MCNC: <1.2 MG/DL (ref 0–20)
MICROALBUMIN/CREAT UR-RTO: NORMAL MG/G (ref 0–30)
MONOCYTES # BLD: 0.5 K/UL (ref 0.1–1.3)
MONOCYTES NFR BLD: 5 % (ref 4–12)
NEUTS SEG # BLD: 7.8 K/UL (ref 1.7–8.2)
NEUTS SEG NFR BLD: 78 % (ref 43–78)
NITRITE, URINE, POC: NEGATIVE
NRBC # BLD: 0 K/UL (ref 0–0.2)
PH, URINE, POC: 5.5 (ref 4.6–8)
PLATELET # BLD AUTO: 190 K/UL (ref 150–450)
PMV BLD AUTO: 10.9 FL (ref 9.4–12.3)
POTASSIUM SERPL-SCNC: 4.2 MMOL/L (ref 3.5–5.1)
PROT SERPL-MCNC: 6.1 G/DL (ref 6.3–8.2)
PROTEIN,URINE, POC: NEGATIVE
RBC # BLD AUTO: 4.57 M/UL (ref 4.23–5.6)
SODIUM SERPL-SCNC: 143 MMOL/L (ref 136–145)
SPECIFIC GRAVITY, URINE, POC: 1.02 (ref 1–1.03)
TRIGL SERPL-MCNC: 109 MG/DL (ref 0–150)
URINALYSIS CLARITY, POC: CLEAR
URINALYSIS COLOR, POC: YELLOW
UROBILINOGEN, POC: NORMAL
VLDLC SERPL CALC-MCNC: 22 MG/DL (ref 6–23)
WBC # BLD AUTO: 10 K/UL (ref 4.3–11.1)

## 2024-06-25 PROCEDURE — 81003 URINALYSIS AUTO W/O SCOPE: CPT | Performed by: FAMILY MEDICINE

## 2024-07-02 ENCOUNTER — TELEMEDICINE (OUTPATIENT)
Dept: FAMILY MEDICINE CLINIC | Facility: CLINIC | Age: 74
End: 2024-07-02
Payer: COMMERCIAL

## 2024-07-02 DIAGNOSIS — K21.9 GASTROESOPHAGEAL REFLUX DISEASE WITHOUT ESOPHAGITIS: ICD-10-CM

## 2024-07-02 DIAGNOSIS — E55.9 VITAMIN D DEFICIENCY: ICD-10-CM

## 2024-07-02 DIAGNOSIS — J44.9 CHRONIC OBSTRUCTIVE PULMONARY DISEASE, UNSPECIFIED COPD TYPE (HCC): ICD-10-CM

## 2024-07-02 DIAGNOSIS — J30.89 ENVIRONMENTAL AND SEASONAL ALLERGIES: ICD-10-CM

## 2024-07-02 DIAGNOSIS — E11.9 TYPE 2 DIABETES MELLITUS WITHOUT COMPLICATION, WITHOUT LONG-TERM CURRENT USE OF INSULIN (HCC): ICD-10-CM

## 2024-07-02 DIAGNOSIS — R11.0 CHRONIC NAUSEA: ICD-10-CM

## 2024-07-02 DIAGNOSIS — D64.9 ANEMIA, UNSPECIFIED TYPE: Primary | ICD-10-CM

## 2024-07-02 PROCEDURE — 99442 PR PHYS/QHP TELEPHONE EVALUATION 11-20 MIN: CPT | Performed by: FAMILY MEDICINE

## 2024-07-02 RX ORDER — ERGOCALCIFEROL 1.25 MG/1
50000 CAPSULE ORAL
Qty: 13 CAPSULE | Refills: 4 | Status: SHIPPED | OUTPATIENT
Start: 2024-07-02

## 2024-07-02 RX ORDER — GLIPIZIDE 10 MG/1
10 TABLET, FILM COATED, EXTENDED RELEASE ORAL DAILY
Qty: 90 TABLET | Refills: 3 | Status: SHIPPED | OUTPATIENT
Start: 2024-07-02 | End: 2025-06-27

## 2024-07-02 RX ORDER — ESOMEPRAZOLE MAGNESIUM 40 MG/1
40 CAPSULE, DELAYED RELEASE ORAL DAILY
Qty: 90 CAPSULE | Refills: 3 | Status: SHIPPED | OUTPATIENT
Start: 2024-07-02

## 2024-07-02 RX ORDER — ALBUTEROL SULFATE 2.5 MG/3ML
2.5 SOLUTION RESPIRATORY (INHALATION) 4 TIMES DAILY
Qty: 540 ML | Refills: 3 | Status: SHIPPED | OUTPATIENT
Start: 2024-07-02

## 2024-07-02 RX ORDER — CETIRIZINE HYDROCHLORIDE 5 MG/1
5 TABLET ORAL DAILY
Qty: 90 TABLET | Refills: 3 | Status: SHIPPED | OUTPATIENT
Start: 2024-07-02

## 2024-07-02 ASSESSMENT — ENCOUNTER SYMPTOMS
SHORTNESS OF BREATH: 0
NAUSEA: 0
VOMITING: 0

## 2024-07-02 NOTE — PROGRESS NOTES
PROGRESS NOTE    SUBJECTIVE:   Gianni De La O is a 73 y.o. male seen for a follow up visit regarding the following chief complaint:     Chief Complaint   Patient presents with    Follow-up           HPI  Patient is doing a phone call visit to go over his lab results patient also states that he went to go see hematology but he still for his prostate and that for his anemiaDavid Vlad De La O is a 73 y.o. male evaluated via telephone on 7/2/2024 for Follow-up  .        Total Time: minutes: 11-20 minutes    Gianni De La O was evaluated through a synchronous (real-time) audio encounter. Patient identification was verified at the start of the visit. He (or guardian if applicable) is aware that this is a billable service, which includes applicable co-pays. This visit was conducted with the patient's (and/or legal guardian's) verbal consent. He has not had a related appointment within my department in the past 7 days or scheduled within the next 24 hours.   The patient was located at Home: [address] [place] [address].  The provider was located at Facility (Appt Dept): [address] [place] [address]  [address] [phone].    Note: not billable if this call serves to triage the patient into an appointment for the relevant concern       Past Medical History, Past Surgical History, Family history, Social History, and Medications were all reviewed with the patient today and updated as necessary.       Current Outpatient Medications   Medication Sig Dispense Refill    albuterol (PROVENTIL) (2.5 MG/3ML) 0.083% nebulizer solution Take 3 mLs by nebulization 4 times daily 540 mL 3    cetirizine (ZYRTEC) 5 MG tablet Take 1 tablet by mouth daily 90 tablet 3    ergocalciferol (ERGOCALCIFEROL) 1.25 MG (10297 UT) capsule Take 1 capsule by mouth every 7 days 13 capsule 4    esomeprazole (NEXIUM) 40 MG delayed release capsule Take 1 capsule by mouth daily 90 capsule 3    glipiZIDE (GLUCOTROL XL) 10 MG extended release tablet

## 2024-07-19 DIAGNOSIS — K21.9 GASTROESOPHAGEAL REFLUX DISEASE WITHOUT ESOPHAGITIS: Primary | ICD-10-CM

## 2024-07-19 NOTE — TELEPHONE ENCOUNTER
Insurance will not cover esomeprazole. Formulary   Famotidine  Lansoprazole  Omeprazole  Pantoprazole  Please advise.

## 2024-07-22 RX ORDER — PANTOPRAZOLE SODIUM 40 MG/1
40 TABLET, DELAYED RELEASE ORAL DAILY
Qty: 90 TABLET | Refills: 3 | Status: SHIPPED | OUTPATIENT
Start: 2024-07-22

## 2024-08-14 DIAGNOSIS — D64.9 ANEMIA, UNSPECIFIED TYPE: Primary | ICD-10-CM

## 2024-08-14 NOTE — PROGRESS NOTES
NEW PATIENT INTAKE  Gianni De La O     Referral Diagnosis: Anemia, unspecified type    Referring Provider:  Eldon Solorio DO    Primary Care Provider: Eldon Solorio DO    Family History of Cancer/ Hematology Disorders: Mother, brother, and sister with unspecified type of cancer     Presenting Symptoms:  SOB, weakness    Chronological History of Pertinent Events:  Mr. De La O is a 74-year-old white male referred for anemia. PMH is significant for atrial fibrillation for which pt is chronically anticoagulated with coumadin.    4/16/24: CBC significant for HGB 9.4, HCT 34.5, MCV 73.9, MCH 20.1, MCHC 27.2, RDW 19.8 (Epic/Labs)    5/9/24: Iron/Anemia panel: Ferritin 11, iron 25 (L), Transferrin 440 (H), B12 226 (Epic/Labs)    6/6/24 - 6/13/24: Hospitalized at [facility] after presenting to the ED with shortness of breath. Chest x-ray in the ED was concerning for a possible right basilar opacity and he was treated with broad spectrum abx for RLL pneumonia. Pt's HGB was 9.0 in the ED, dropping to 7.9 during admission (CE/Labs). Pt denied any sign of overt bleeding. Anemia was thought to possibly be contributing to his SOB and pt was treated with 1 dose of IV iron supplement prior to discharge.     6/19/24: Medicare AWV with PCP  -Referred to [facility] for chronic nausea     6/25/24: CBC significant for HGB 9.2, HCT 34.0, MCV 74.4, MCH 20.1, MCHC 27.1, RDW 19.9    7/2/24: Referred to [facility]    8/5/24 - 8/9/24: Admitted at [facility] for COPD exacerbation after presenting to the ED with SOB and wheezing. Chest x-ray did not show any obvious consolidation, effusion or infiltrate. Patient was treated empirically with one dose of ceftriaxone and continued on a 5-day course of doxycycline. Treatment begun with scheduled albuterol and breathing treatments of Brovana, Plumicort, Spiriva along with prednisone 40 mg qd. HGB remained stable between 9.3 and 8.5. Iron/anemia panel was significant for iron 14, TIBC 524,

## 2024-09-12 ENCOUNTER — TELEPHONE (OUTPATIENT)
Age: 74
End: 2024-09-12

## 2024-09-26 ENCOUNTER — TELEPHONE (OUTPATIENT)
Dept: FAMILY MEDICINE CLINIC | Facility: CLINIC | Age: 74
End: 2024-09-26

## 2024-10-23 ENCOUNTER — TELEPHONE (OUTPATIENT)
Age: 74
End: 2024-10-23

## 2024-10-23 NOTE — TELEPHONE ENCOUNTER
MEDICATION REFILL REQUEST      Name of Medication:Lisinopril  Dose:10 mg  Frequency:  QD  Quantity:  90  Days' supply:  90 with 3 refills      Pharmacy Name/Location:  [place] [address]    MEDICATION REFILL REQUEST      Name of Medication:  Rosuvastatin  Dose:  20 mg  Frequency:  QD  Quantity:  90  Days' supply:  90 with 3 refills      Pharmacy Name/Location:  [place] [address]

## 2024-10-23 NOTE — TELEPHONE ENCOUNTER
LVM informing patient he needs to make a follow up appointment with Dr. Ervin before any refills are issued.

## 2024-10-29 ENCOUNTER — OFFICE VISIT (OUTPATIENT)
Age: 74
End: 2024-10-29

## 2024-10-29 VITALS
HEIGHT: 73 IN | WEIGHT: 183 LBS | BODY MASS INDEX: 24.25 KG/M2 | DIASTOLIC BLOOD PRESSURE: 68 MMHG | HEART RATE: 91 BPM | SYSTOLIC BLOOD PRESSURE: 118 MMHG

## 2024-10-29 DIAGNOSIS — I10 ESSENTIAL (PRIMARY) HYPERTENSION: Primary | ICD-10-CM

## 2024-10-29 DIAGNOSIS — I42.9 CARDIOMYOPATHY, UNSPECIFIED TYPE (HCC): ICD-10-CM

## 2024-10-29 DIAGNOSIS — I48.0 PAROXYSMAL ATRIAL FIBRILLATION (HCC): ICD-10-CM

## 2024-10-29 RX ORDER — ROSUVASTATIN CALCIUM 20 MG/1
20 TABLET, COATED ORAL DAILY
Qty: 90 TABLET | Refills: 3 | Status: SHIPPED | OUTPATIENT
Start: 2024-10-29

## 2024-10-29 RX ORDER — CARVEDILOL 3.12 MG/1
3.12 TABLET ORAL 2 TIMES DAILY WITH MEALS
Qty: 180 TABLET | Refills: 3 | Status: SHIPPED | OUTPATIENT
Start: 2024-10-29

## 2024-10-29 RX ORDER — LISINOPRIL 10 MG/1
10 TABLET ORAL DAILY
Qty: 90 TABLET | Refills: 3 | Status: SHIPPED | OUTPATIENT
Start: 2024-10-29

## 2024-11-11 ASSESSMENT — ENCOUNTER SYMPTOMS
SHORTNESS OF BREATH: 0
ORTHOPNEA: 0
ABDOMINAL PAIN: 0

## 2024-11-11 NOTE — PROGRESS NOTES
MONDAY AND EVERY WEDNESDAY AND EVERY FRIDAY 4/6/24  Yes Provider, MD Micheline   OXYGEN Inhale into the lungs daily as needed for Shortness of Breath Use as instructed. Use as instructed. DME: [facility] 2-3lpm continuously   Yes Automatic Reconciliation, Ar   acetaminophen (TYLENOL) 500 MG tablet Take 2 tablets by mouth daily as needed   Yes Automatic Reconciliation, Ar   albuterol sulfate  (90 Base) MCG/ACT inhaler Inhale 2 puffs into the lungs every 4 hours as needed 2/10/22  Yes Automatic Reconciliation, Ar   Fluticasone-Umeclidin-Vilant (TRELEGY ELLIPTA) 200-62.5-25 MCG/INH AEPB INHALE 1 PUFF ONCE DAILY 8/24/21  Yes Automatic Reconciliation, Ar   mirtazapine (REMERON) 15 MG tablet Take 1 tablet by mouth as needed 8/31/21  Yes Automatic Reconciliation, Ar   warfarin (COUMADIN) 10 MG tablet Take 1 tablet by mouth daily 7.5 Monday and rest of days 5mg. 8/31/21  Yes Automatic Reconciliation, Ar   warfarin (COUMADIN) 5 MG tablet Take 1 tablet by mouth daily 8/31/21  Yes Automatic Reconciliation, Ar     Allergies   Allergen Reactions    Azithromycin Other (See Comments)     EKG changes  Other reaction(s): Other- (not listed) - Allergy  EKG changes, including Vtach.    Erythromycin Other (See Comments)     Other reaction(s): Other- (not listed) - Allergy  EKG changes     Past Medical History:   Diagnosis Date    Abnormal weight loss 2/18/2016    Acute vascular disorder of intestine (HCC) 2/18/2016    Chronic lung disease     COPD (chronic obstructive pulmonary disease) ([facility]) 2/18/2016    Diabetes mellitus type II, controlled, with no complications ([facility]) 2/18/2016    Elevated troponin 2/18/2016    Tobacco use 2/18/2016    Vitamin D deficiency 8/27/2018     Past Surgical History:   Procedure Laterality Date    APPENDECTOMY  2009    CYST REMOVAL Right     Right shoulder    HERNIA REPAIR  2005    LAPAROSCOPY  09/03/2015    Dr. Ford    LAPAROTOMY  09/08/2015    Washout & abd closure-Dr. East    OTHER SURGICAL HISTORY

## 2025-07-07 ENCOUNTER — OFFICE VISIT (OUTPATIENT)
Dept: FAMILY MEDICINE CLINIC | Facility: CLINIC | Age: 75
End: 2025-07-07
Payer: COMMERCIAL

## 2025-07-07 VITALS
BODY MASS INDEX: 22.93 KG/M2 | OXYGEN SATURATION: 98 % | HEIGHT: 73 IN | WEIGHT: 173 LBS | DIASTOLIC BLOOD PRESSURE: 68 MMHG | SYSTOLIC BLOOD PRESSURE: 126 MMHG | HEART RATE: 66 BPM

## 2025-07-07 DIAGNOSIS — J96.21 ACUTE ON CHRONIC RESPIRATORY FAILURE WITH HYPOXEMIA (HCC): ICD-10-CM

## 2025-07-07 DIAGNOSIS — Z12.5 SPECIAL SCREENING FOR MALIGNANT NEOPLASM OF PROSTATE: ICD-10-CM

## 2025-07-07 DIAGNOSIS — K21.9 GASTROESOPHAGEAL REFLUX DISEASE WITHOUT ESOPHAGITIS: ICD-10-CM

## 2025-07-07 DIAGNOSIS — J44.9 CHRONIC OBSTRUCTIVE PULMONARY DISEASE, UNSPECIFIED COPD TYPE (HCC): ICD-10-CM

## 2025-07-07 DIAGNOSIS — I48.0 PAROXYSMAL ATRIAL FIBRILLATION (HCC): ICD-10-CM

## 2025-07-07 DIAGNOSIS — E11.9 TYPE 2 DIABETES MELLITUS WITHOUT COMPLICATION, WITHOUT LONG-TERM CURRENT USE OF INSULIN (HCC): ICD-10-CM

## 2025-07-07 DIAGNOSIS — Z13.31 SCREENING FOR DEPRESSION: ICD-10-CM

## 2025-07-07 DIAGNOSIS — N30.00 ACUTE CYSTITIS WITHOUT HEMATURIA: ICD-10-CM

## 2025-07-07 DIAGNOSIS — R53.83 OTHER FATIGUE: ICD-10-CM

## 2025-07-07 DIAGNOSIS — Z71.89 ACP (ADVANCE CARE PLANNING): ICD-10-CM

## 2025-07-07 DIAGNOSIS — Z00.00 ROUTINE GENERAL MEDICAL EXAMINATION AT A HEALTH CARE FACILITY: ICD-10-CM

## 2025-07-07 DIAGNOSIS — Z72.0 TOBACCO USE: ICD-10-CM

## 2025-07-07 DIAGNOSIS — E78.2 MIXED HYPERLIPIDEMIA: ICD-10-CM

## 2025-07-07 DIAGNOSIS — I74.9 EMBOLISM (HCC): ICD-10-CM

## 2025-07-07 DIAGNOSIS — E11.9 CONTROLLED TYPE 2 DIABETES MELLITUS WITHOUT COMPLICATION, WITHOUT LONG-TERM CURRENT USE OF INSULIN (HCC): ICD-10-CM

## 2025-07-07 DIAGNOSIS — I42.9 CARDIOMYOPATHY, UNSPECIFIED TYPE (HCC): ICD-10-CM

## 2025-07-07 DIAGNOSIS — Z12.11 SPECIAL SCREENING FOR MALIGNANT NEOPLASMS, COLON: ICD-10-CM

## 2025-07-07 DIAGNOSIS — Z00.00 MEDICARE ANNUAL WELLNESS VISIT, SUBSEQUENT: Primary | ICD-10-CM

## 2025-07-07 DIAGNOSIS — I10 ESSENTIAL HYPERTENSION: ICD-10-CM

## 2025-07-07 DIAGNOSIS — E55.9 VITAMIN D DEFICIENCY: ICD-10-CM

## 2025-07-07 DIAGNOSIS — J30.89 ENVIRONMENTAL AND SEASONAL ALLERGIES: ICD-10-CM

## 2025-07-07 DIAGNOSIS — I50.42 CHRONIC COMBINED SYSTOLIC AND DIASTOLIC CONGESTIVE HEART FAILURE (HCC): ICD-10-CM

## 2025-07-07 PROCEDURE — 99497 ADVNCD CARE PLAN 30 MIN: CPT | Performed by: FAMILY MEDICINE

## 2025-07-07 PROCEDURE — 1159F MED LIST DOCD IN RCRD: CPT | Performed by: FAMILY MEDICINE

## 2025-07-07 PROCEDURE — G0439 PPPS, SUBSEQ VISIT: HCPCS | Performed by: FAMILY MEDICINE

## 2025-07-07 PROCEDURE — 3078F DIAST BP <80 MM HG: CPT | Performed by: FAMILY MEDICINE

## 2025-07-07 PROCEDURE — 3074F SYST BP LT 130 MM HG: CPT | Performed by: FAMILY MEDICINE

## 2025-07-07 PROCEDURE — 1123F ACP DISCUSS/DSCN MKR DOCD: CPT | Performed by: FAMILY MEDICINE

## 2025-07-07 PROCEDURE — 99213 OFFICE O/P EST LOW 20 MIN: CPT | Performed by: FAMILY MEDICINE

## 2025-07-07 RX ORDER — TRAMADOL HYDROCHLORIDE 50 MG/1
50 TABLET ORAL EVERY 6 HOURS PRN
COMMUNITY
Start: 2025-02-16

## 2025-07-07 RX ORDER — DIGOXIN 125 MCG
250 TABLET ORAL DAILY
COMMUNITY
Start: 2025-07-01 | End: 2025-09-29

## 2025-07-07 RX ORDER — ERGOCALCIFEROL 1.25 MG/1
CAPSULE, LIQUID FILLED ORAL
COMMUNITY

## 2025-07-07 RX ORDER — FUROSEMIDE 40 MG/1
40 TABLET ORAL DAILY
COMMUNITY
Start: 2025-06-30

## 2025-07-07 RX ORDER — CETIRIZINE HYDROCHLORIDE 5 MG/1
TABLET ORAL
COMMUNITY
End: 2025-07-07 | Stop reason: CLARIF

## 2025-07-07 RX ORDER — CIPROFLOXACIN 500 MG/1
500 TABLET, FILM COATED ORAL 2 TIMES DAILY
Qty: 14 TABLET | Refills: 0 | Status: SHIPPED | OUTPATIENT
Start: 2025-07-07 | End: 2025-07-14

## 2025-07-07 RX ORDER — CIPROFLOXACIN 500 MG/1
TABLET, FILM COATED ORAL
COMMUNITY
Start: 2025-06-30 | End: 2025-07-07 | Stop reason: CLARIF

## 2025-07-07 RX ORDER — SPIRONOLACTONE 25 MG/1
25 TABLET ORAL DAILY
COMMUNITY
Start: 2025-07-01 | End: 2025-09-29

## 2025-07-07 RX ORDER — CETIRIZINE HYDROCHLORIDE 5 MG/1
5 TABLET ORAL DAILY
Qty: 90 TABLET | Refills: 3 | Status: SHIPPED | OUTPATIENT
Start: 2025-07-07

## 2025-07-07 RX ORDER — PREDNISONE 5 MG/1
TABLET ORAL
COMMUNITY
Start: 2025-04-14 | End: 2025-07-07 | Stop reason: CLARIF

## 2025-07-07 RX ORDER — LISINOPRIL 5 MG/1
5 TABLET ORAL DAILY
COMMUNITY
Start: 2025-06-30

## 2025-07-07 RX ORDER — TAMSULOSIN HYDROCHLORIDE 0.4 MG/1
0.4 CAPSULE ORAL
COMMUNITY
Start: 2025-06-30 | End: 2025-07-30

## 2025-07-07 RX ORDER — ESOMEPRAZOLE MAGNESIUM 40 MG/1
40 CAPSULE, DELAYED RELEASE ORAL DAILY
Qty: 90 CAPSULE | Refills: 3 | Status: SHIPPED | OUTPATIENT
Start: 2025-07-07

## 2025-07-07 RX ORDER — PANTOPRAZOLE SODIUM 40 MG/1
40 TABLET, DELAYED RELEASE ORAL DAILY
Qty: 90 TABLET | Refills: 3 | Status: SHIPPED | OUTPATIENT
Start: 2025-07-07

## 2025-07-07 RX ORDER — ERGOCALCIFEROL 1.25 MG/1
50000 CAPSULE ORAL
Qty: 13 CAPSULE | Refills: 4 | Status: SHIPPED | OUTPATIENT
Start: 2025-07-07

## 2025-07-07 RX ORDER — ARFORMOTEROL TARTRATE 15 UG/2ML
15 SOLUTION RESPIRATORY (INHALATION) EVERY 12 HOURS
COMMUNITY
Start: 2025-02-16

## 2025-07-07 RX ORDER — FERROUS SULFATE 325(65) MG
TABLET ORAL
COMMUNITY
Start: 2025-06-30

## 2025-07-07 RX ORDER — METOPROLOL SUCCINATE 100 MG/1
100 TABLET, EXTENDED RELEASE ORAL DAILY
COMMUNITY
Start: 2025-06-30 | End: 2025-09-28

## 2025-07-07 RX ORDER — PREDNISONE 10 MG/1
TABLET ORAL
COMMUNITY
Start: 2025-07-01 | End: 2025-07-12

## 2025-07-07 RX ORDER — METOPROLOL TARTRATE 25 MG/1
TABLET, FILM COATED ORAL
COMMUNITY
End: 2025-07-07 | Stop reason: CLARIF

## 2025-07-07 RX ORDER — GLIPIZIDE 10 MG/1
10 TABLET, FILM COATED, EXTENDED RELEASE ORAL DAILY
Qty: 90 TABLET | Refills: 3 | Status: SHIPPED | OUTPATIENT
Start: 2025-07-07 | End: 2026-07-02

## 2025-07-07 RX ORDER — GUAIFENESIN 600 MG/1
600 TABLET, EXTENDED RELEASE ORAL 2 TIMES DAILY
COMMUNITY
Start: 2025-02-16

## 2025-07-07 RX ORDER — BUDESONIDE 0.5 MG/2ML
0.5 INHALANT ORAL EVERY 12 HOURS
COMMUNITY
Start: 2025-02-16

## 2025-07-07 SDOH — ECONOMIC STABILITY: FOOD INSECURITY: WITHIN THE PAST 12 MONTHS, YOU WORRIED THAT YOUR FOOD WOULD RUN OUT BEFORE YOU GOT MONEY TO BUY MORE.: NEVER TRUE

## 2025-07-07 SDOH — ECONOMIC STABILITY: FOOD INSECURITY: WITHIN THE PAST 12 MONTHS, THE FOOD YOU BOUGHT JUST DIDN'T LAST AND YOU DIDN'T HAVE MONEY TO GET MORE.: NEVER TRUE

## 2025-07-07 ASSESSMENT — LIFESTYLE VARIABLES
HOW MANY STANDARD DRINKS CONTAINING ALCOHOL DO YOU HAVE ON A TYPICAL DAY: PATIENT DOES NOT DRINK
HOW OFTEN DO YOU HAVE A DRINK CONTAINING ALCOHOL: MONTHLY OR LESS

## 2025-07-07 ASSESSMENT — PATIENT HEALTH QUESTIONNAIRE - PHQ9: DEPRESSION UNABLE TO ASSESS: URGENT/EMERGENT SITUATION

## 2025-07-07 NOTE — PROGRESS NOTES
masses or organomegaly  Genitourinary/Rectal: genitals normal without hernia or inguinal adenopathy, rectal normal without masses, prostate normal in size without masses or nodules  Extremities: no cyanosis, clubbing or edema  Musculoskeletal: normal range of motion, no joint swelling, deformity or tenderness  Neurologic: reflexes normal and symmetric, no cranial nerve deficit, gait, coordination and speech normal            Allergies   Allergen Reactions    Azithromycin Other (See Comments)     EKG changes  Other reaction(s): Other- (not listed) - Allergy  EKG changes, including Vtach.    Erythromycin Other (See Comments)     Other reaction(s): Other- (not listed) - Allergy  EKG changes     Prior to Visit Medications    Medication Sig Taking? Authorizing Provider   apixaban (ELIQUIS) 5 MG TABS tablet Take 1 tablet by mouth 2 times daily Yes Micheline Murray MD   arformoterol tartrate (BROVANA) 15 MCG/2ML NEBU Inhale 2 mLs into the lungs in the morning and 2 mLs in the evening. Yes Micheline Murray MD   budesonide (PULMICORT) 0.5 MG/2ML nebulizer suspension Inhale 2 mLs into the lungs in the morning and 2 mLs in the evening. Yes Micheline Murray MD   digoxin (LANOXIN) 125 MCG tablet Take 2 tablets by mouth daily Yes Micheline Murray MD   FEROSUL 325 (65 Fe) MG tablet TAKE 1 TABLET BY MOUTH IN THE MORNING AND 1 IN THE EVENING WITH MEALS Yes Micheline Murray MD   furosemide (LASIX) 40 MG tablet Take 1 tablet by mouth daily Yes Micheline Murray MD   guaiFENesin (MUCINEX) 600 MG extended release tablet Take 1 tablet by mouth 2 times daily Yes Micheline Murray MD   metoprolol succinate (TOPROL XL) 100 MG extended release tablet Take 1 tablet by mouth daily Yes Micheline Murray MD   predniSONE (DELTASONE) 10 MG tablet Take by mouth Yes Micheline Murray MD   spironolactone (ALDACTONE) 25 MG tablet Take 1 tablet by mouth daily Yes Micheline Murray MD   tamsulosin (FLOMAX)

## 2025-07-17 ENCOUNTER — LAB (OUTPATIENT)
Dept: FAMILY MEDICINE CLINIC | Facility: CLINIC | Age: 75
End: 2025-07-17
Payer: COMMERCIAL

## 2025-07-17 DIAGNOSIS — Z12.5 SPECIAL SCREENING FOR MALIGNANT NEOPLASM OF PROSTATE: ICD-10-CM

## 2025-07-17 DIAGNOSIS — E78.2 MIXED HYPERLIPIDEMIA: ICD-10-CM

## 2025-07-17 DIAGNOSIS — E11.9 CONTROLLED TYPE 2 DIABETES MELLITUS WITHOUT COMPLICATION, WITHOUT LONG-TERM CURRENT USE OF INSULIN (HCC): ICD-10-CM

## 2025-07-17 DIAGNOSIS — E55.9 VITAMIN D DEFICIENCY: ICD-10-CM

## 2025-07-17 DIAGNOSIS — N30.00 ACUTE CYSTITIS WITHOUT HEMATURIA: ICD-10-CM

## 2025-07-17 DIAGNOSIS — R53.83 OTHER FATIGUE: ICD-10-CM

## 2025-07-17 DIAGNOSIS — I10 ESSENTIAL HYPERTENSION: ICD-10-CM

## 2025-07-17 LAB
25(OH)D3 SERPL-MCNC: 58.3 NG/ML (ref 30–100)
ALBUMIN SERPL-MCNC: 3 G/DL (ref 3.2–4.6)
ALBUMIN/GLOB SERPL: 1.2 (ref 1–1.9)
ALP SERPL-CCNC: 127 U/L (ref 40–129)
ALT SERPL-CCNC: 40 U/L (ref 8–55)
ANION GAP SERPL CALC-SCNC: 11 MMOL/L (ref 7–16)
AST SERPL-CCNC: 27 U/L (ref 15–37)
BASOPHILS # BLD: 0.01 K/UL (ref 0–0.2)
BASOPHILS NFR BLD: 0.2 % (ref 0–2)
BILIRUB SERPL-MCNC: 0.8 MG/DL (ref 0–1.2)
BILIRUBIN, URINE, POC: NEGATIVE
BLOOD URINE, POC: NEGATIVE
BUN SERPL-MCNC: 5 MG/DL (ref 8–23)
CALCIUM SERPL-MCNC: 8.9 MG/DL (ref 8.8–10.2)
CHLORIDE SERPL-SCNC: 103 MMOL/L (ref 98–107)
CHOLEST SERPL-MCNC: 102 MG/DL (ref 0–200)
CO2 SERPL-SCNC: 23 MMOL/L (ref 20–29)
CREAT SERPL-MCNC: 0.64 MG/DL (ref 0.8–1.3)
DIFFERENTIAL METHOD BLD: ABNORMAL
EOSINOPHIL # BLD: 0.18 K/UL (ref 0–0.8)
EOSINOPHIL NFR BLD: 3.4 % (ref 0.5–7.8)
ERYTHROCYTE [DISTWIDTH] IN BLOOD BY AUTOMATED COUNT: 24.1 % (ref 11.9–14.6)
EST. AVERAGE GLUCOSE BLD GHB EST-MCNC: 155 MG/DL
GLOBULIN SER CALC-MCNC: 2.6 G/DL (ref 2.3–3.5)
GLUCOSE SERPL-MCNC: 97 MG/DL (ref 70–99)
GLUCOSE URINE, POC: NEGATIVE
HBA1C MFR BLD: 7 % (ref 0–5.6)
HCT VFR BLD AUTO: 32 % (ref 41.1–50.3)
HDLC SERPL-MCNC: 65 MG/DL (ref 40–60)
HDLC SERPL: 1.6 (ref 0–5)
HGB BLD-MCNC: 9.4 G/DL (ref 13.6–17.2)
IMM GRANULOCYTES # BLD AUTO: 0.02 K/UL (ref 0–0.5)
IMM GRANULOCYTES NFR BLD AUTO: 0.4 % (ref 0–5)
KETONES, URINE, POC: NEGATIVE
LDLC SERPL CALC-MCNC: 21 MG/DL (ref 0–100)
LEUKOCYTE ESTERASE, URINE, POC: ABNORMAL
LYMPHOCYTES # BLD: 0.9 K/UL (ref 0.5–4.6)
LYMPHOCYTES NFR BLD: 16.9 % (ref 13–44)
MCH RBC QN AUTO: 23 PG (ref 26.1–32.9)
MCHC RBC AUTO-ENTMCNC: 29.4 G/DL (ref 31.4–35)
MCV RBC AUTO: 78.4 FL (ref 82–102)
MONOCYTES # BLD: 0.49 K/UL (ref 0.1–1.3)
MONOCYTES NFR BLD: 9.2 % (ref 4–12)
NEUTS SEG # BLD: 3.73 K/UL (ref 1.7–8.2)
NEUTS SEG NFR BLD: 69.9 % (ref 43–78)
NITRITE, URINE, POC: NEGATIVE
NRBC # BLD: 0 K/UL (ref 0–0.2)
PH, URINE, POC: 5.5 (ref 4.6–8)
PLATELET # BLD AUTO: 219 K/UL (ref 150–450)
PMV BLD AUTO: 10.5 FL (ref 9.4–12.3)
POTASSIUM SERPL-SCNC: 3.8 MMOL/L (ref 3.5–5.1)
PROT SERPL-MCNC: 5.6 G/DL (ref 6.3–8.2)
PROTEIN,URINE, POC: NEGATIVE
PSA SERPL-MCNC: 4 NG/ML (ref 0–4)
RBC # BLD AUTO: 4.08 M/UL (ref 4.23–5.6)
SODIUM SERPL-SCNC: 137 MMOL/L (ref 136–145)
SPECIFIC GRAVITY, URINE, POC: 1.01 (ref 1–1.03)
TRIGL SERPL-MCNC: 79 MG/DL (ref 0–150)
TSH W FREE THYROID IF ABNORMAL: 2.63 UIU/ML (ref 0.27–4.2)
URINALYSIS CLARITY, POC: CLEAR
URINALYSIS COLOR, POC: YELLOW
UROBILINOGEN, POC: NORMAL
VLDLC SERPL CALC-MCNC: 16 MG/DL (ref 6–23)
WBC # BLD AUTO: 5.3 K/UL (ref 4.3–11.1)

## 2025-07-17 PROCEDURE — 81003 URINALYSIS AUTO W/O SCOPE: CPT | Performed by: FAMILY MEDICINE

## 2025-07-19 LAB
BACTERIA SPEC CULT: NORMAL
SERVICE CMNT-IMP: NORMAL

## 2025-07-23 ENCOUNTER — TELEMEDICINE (OUTPATIENT)
Dept: FAMILY MEDICINE CLINIC | Facility: CLINIC | Age: 75
End: 2025-07-23
Payer: COMMERCIAL

## 2025-07-23 DIAGNOSIS — J43.9 PULMONARY EMPHYSEMA, UNSPECIFIED EMPHYSEMA TYPE (HCC): ICD-10-CM

## 2025-07-23 DIAGNOSIS — D64.9 ANEMIA, UNSPECIFIED TYPE: Primary | ICD-10-CM

## 2025-07-23 DIAGNOSIS — I42.9 CARDIOMYOPATHY, UNSPECIFIED TYPE (HCC): ICD-10-CM

## 2025-07-23 DIAGNOSIS — I10 ESSENTIAL HYPERTENSION: ICD-10-CM

## 2025-07-23 DIAGNOSIS — E11.9 TYPE 2 DIABETES MELLITUS WITHOUT COMPLICATION, WITHOUT LONG-TERM CURRENT USE OF INSULIN (HCC): ICD-10-CM

## 2025-07-23 DIAGNOSIS — E78.2 MIXED HYPERLIPIDEMIA: ICD-10-CM

## 2025-07-23 PROCEDURE — 99213 OFFICE O/P EST LOW 20 MIN: CPT | Performed by: FAMILY MEDICINE

## 2025-07-23 ASSESSMENT — PATIENT HEALTH QUESTIONNAIRE - PHQ9: DEPRESSION UNABLE TO ASSESS: PT REFUSES

## 2025-07-23 NOTE — PROGRESS NOTES
PROGRESS NOTE    SUBJECTIVE:   Gianni De La O is a 74 y.o. male seen for a follow up visit regarding the following chief complaint:     Chief Complaint   Patient presents with    Follow-up           HPI patient is doing a phone call visit to go over the lab results with no new complaintsDavimeryl De La O is a 74 y.o. male evaluated via telephone on 7/23/2025 for Follow-up  .        Total Time: minutes: 21-30 minutes  Total time 22 minutes  Gianni De La O was evaluated through a synchronous (real-time) audio encounter. Patient identification was verified at the start of the visit. He (or guardian if applicable) is aware that this is a billable service, which includes applicable co-pays. This visit was conducted with the patient's (and/or legal guardian's) verbal consent. He has not had a related appointment within my department in the past 7 days or scheduled within the next 24 hours.   The patient was located at Home: [address] [place] [address].  The provider was located at Facility (Appt Dept): [address] [place] [address]  [address] [phone].    Note: not billable if this call serves to triage the patient into an appointment for the relevant concern         Past Medical History, Past Surgical History, Family history, Social History, and Medications were all reviewed with the patient today and updated as necessary.       Current Outpatient Medications   Medication Sig Dispense Refill    apixaban (ELIQUIS) 5 MG TABS tablet Take 1 tablet by mouth 2 times daily      arformoterol tartrate (BROVANA) 15 MCG/2ML NEBU Inhale 2 mLs into the lungs in the morning and 2 mLs in the evening.      budesonide (PULMICORT) 0.5 MG/2ML nebulizer suspension Inhale 2 mLs into the lungs in the morning and 2 mLs in the evening.      digoxin (LANOXIN) 125 MCG tablet Take 2 tablets by mouth daily      FEROSUL 325 (65 Fe) MG tablet TAKE 1 TABLET BY MOUTH IN THE MORNING AND 1 IN THE EVENING WITH MEALS      furosemide

## 2025-08-12 ENCOUNTER — OFFICE VISIT (OUTPATIENT)
Age: 75
End: 2025-08-12
Payer: COMMERCIAL

## 2025-08-12 ENCOUNTER — LAB (OUTPATIENT)
Dept: FAMILY MEDICINE CLINIC | Facility: CLINIC | Age: 75
End: 2025-08-12
Payer: COMMERCIAL

## 2025-08-12 VITALS
SYSTOLIC BLOOD PRESSURE: 96 MMHG | HEIGHT: 73 IN | WEIGHT: 169 LBS | HEART RATE: 80 BPM | DIASTOLIC BLOOD PRESSURE: 74 MMHG | BODY MASS INDEX: 22.4 KG/M2

## 2025-08-12 DIAGNOSIS — D64.9 ANEMIA, UNSPECIFIED TYPE: ICD-10-CM

## 2025-08-12 DIAGNOSIS — I48.0 PAROXYSMAL ATRIAL FIBRILLATION (HCC): ICD-10-CM

## 2025-08-12 DIAGNOSIS — I42.9 CARDIOMYOPATHY, UNSPECIFIED TYPE (HCC): ICD-10-CM

## 2025-08-12 DIAGNOSIS — I10 ESSENTIAL (PRIMARY) HYPERTENSION: Primary | ICD-10-CM

## 2025-08-12 LAB
FERRITIN SERPL-MCNC: 51 NG/ML (ref 8–388)
GRANS ABSOLUTE, POC: ABNORMAL K/UL
GRANULOCYTES %, POC: 72.2 %
HEMATOCRIT, POC: ABNORMAL %
HEMOGLOBIN, POC: ABNORMAL G/DL
IRON SERPL-MCNC: 119 UG/DL (ref 35–100)
LYMPHOCYTE %, POC: 19.4 %
LYMPHS ABSOLUTE, POC: 2.3 K/UL
MCH, POC: ABNORMAL PG (ref 20–?)
MCHC, POC: 32.2
MCV, POC: ABNORMAL
MONOCYTE %, POC: 8.4 %
MONOCYTE, ABSOLUTE POC: 1 K/UL
MPV, POC: 8.9 FL
PLATELET COUNT, POC: 354 K/UL
RBC, POC: 4.65 M/UL
RDW, POC: ABNORMAL %
TRANSFERRIN SERPL-MCNC: 391 MG/DL (ref 200–360)
VIT B12 SERPL-MCNC: 210 PG/ML (ref 193–986)
WBC, POC: ABNORMAL K/UL

## 2025-08-12 PROCEDURE — 99214 OFFICE O/P EST MOD 30 MIN: CPT | Performed by: INTERNAL MEDICINE

## 2025-08-12 PROCEDURE — 36415 COLL VENOUS BLD VENIPUNCTURE: CPT | Performed by: FAMILY MEDICINE

## 2025-08-12 PROCEDURE — 1123F ACP DISCUSS/DSCN MKR DOCD: CPT | Performed by: INTERNAL MEDICINE

## 2025-08-12 PROCEDURE — 1126F AMNT PAIN NOTED NONE PRSNT: CPT | Performed by: INTERNAL MEDICINE

## 2025-08-12 PROCEDURE — 1159F MED LIST DOCD IN RCRD: CPT | Performed by: INTERNAL MEDICINE

## 2025-08-12 PROCEDURE — 3074F SYST BP LT 130 MM HG: CPT | Performed by: INTERNAL MEDICINE

## 2025-08-12 PROCEDURE — 3078F DIAST BP <80 MM HG: CPT | Performed by: INTERNAL MEDICINE

## 2025-08-12 RX ORDER — LISINOPRIL 5 MG/1
5 TABLET ORAL DAILY
Qty: 90 TABLET | Refills: 1 | Status: SHIPPED | OUTPATIENT
Start: 2025-08-12 | End: 2026-02-08

## 2025-08-12 RX ORDER — PREDNISONE 10 MG/1
10 TABLET ORAL DAILY
COMMUNITY

## 2025-08-12 ASSESSMENT — ENCOUNTER SYMPTOMS
SHORTNESS OF BREATH: 0
ABDOMINAL PAIN: 0

## 2025-08-18 ENCOUNTER — TELEMEDICINE (OUTPATIENT)
Dept: FAMILY MEDICINE CLINIC | Facility: CLINIC | Age: 75
End: 2025-08-18
Payer: COMMERCIAL

## 2025-08-18 DIAGNOSIS — E78.2 MIXED HYPERLIPIDEMIA: ICD-10-CM

## 2025-08-18 DIAGNOSIS — I10 ESSENTIAL HYPERTENSION: ICD-10-CM

## 2025-08-18 DIAGNOSIS — E11.9 TYPE 2 DIABETES MELLITUS WITHOUT COMPLICATION, WITHOUT LONG-TERM CURRENT USE OF INSULIN (HCC): ICD-10-CM

## 2025-08-18 DIAGNOSIS — D64.9 ANEMIA, UNSPECIFIED TYPE: Primary | ICD-10-CM

## 2025-08-18 PROCEDURE — 99213 OFFICE O/P EST LOW 20 MIN: CPT | Performed by: FAMILY MEDICINE

## 2025-08-18 RX ORDER — IPRATROPIUM BROMIDE AND ALBUTEROL SULFATE 2.5; .5 MG/3ML; MG/3ML
3 SOLUTION RESPIRATORY (INHALATION) EVERY 6 HOURS PRN
COMMUNITY
Start: 2025-07-25 | End: 2026-07-25

## 2025-08-18 ASSESSMENT — PATIENT HEALTH QUESTIONNAIRE - PHQ9: DEPRESSION UNABLE TO ASSESS: PT REFUSES
